# Patient Record
Sex: FEMALE | Race: WHITE | Employment: FULL TIME | URBAN - NONMETROPOLITAN AREA
[De-identification: names, ages, dates, MRNs, and addresses within clinical notes are randomized per-mention and may not be internally consistent; named-entity substitution may affect disease eponyms.]

---

## 2022-02-10 ENCOUNTER — TELEPHONE (OUTPATIENT)
Dept: CARDIOLOGY CLINIC | Age: 62
End: 2022-02-10

## 2022-02-17 ENCOUNTER — OFFICE VISIT (OUTPATIENT)
Dept: CARDIOLOGY CLINIC | Age: 62
End: 2022-02-17

## 2022-02-17 VITALS
SYSTOLIC BLOOD PRESSURE: 122 MMHG | HEIGHT: 66 IN | HEART RATE: 64 BPM | DIASTOLIC BLOOD PRESSURE: 70 MMHG | WEIGHT: 145 LBS | BODY MASS INDEX: 23.3 KG/M2

## 2022-02-17 DIAGNOSIS — I10 PRIMARY HYPERTENSION: Primary | ICD-10-CM

## 2022-02-17 DIAGNOSIS — I48.0 PAROXYSMAL ATRIAL FIBRILLATION (HCC): ICD-10-CM

## 2022-02-17 PROCEDURE — 93000 ELECTROCARDIOGRAM COMPLETE: CPT | Performed by: NUCLEAR MEDICINE

## 2022-02-17 PROCEDURE — 99204 OFFICE O/P NEW MOD 45 MIN: CPT | Performed by: NUCLEAR MEDICINE

## 2022-02-17 RX ORDER — M-VIT,TX,IRON,MINS/CALC/FOLIC 27MG-0.4MG
1 TABLET ORAL DAILY
COMMUNITY

## 2022-02-17 RX ORDER — METOPROLOL SUCCINATE 50 MG/1
50 TABLET, EXTENDED RELEASE ORAL 2 TIMES DAILY
Qty: 180 TABLET | Refills: 3 | Status: SHIPPED | OUTPATIENT
Start: 2022-02-17 | End: 2022-03-23

## 2022-02-17 RX ORDER — METOPROLOL SUCCINATE 50 MG/1
50 TABLET, EXTENDED RELEASE ORAL DAILY
COMMUNITY
End: 2022-02-17 | Stop reason: SDUPTHER

## 2022-02-17 RX ORDER — CALCIUM CARBONATE 500(1250)
500 TABLET ORAL DAILY
COMMUNITY

## 2022-02-17 RX ORDER — DILTIAZEM HYDROCHLORIDE 120 MG/1
120 CAPSULE, EXTENDED RELEASE ORAL DAILY
COMMUNITY
End: 2022-03-23 | Stop reason: ALTCHOICE

## 2022-02-17 RX ORDER — ACETAMINOPHEN 160 MG
TABLET,DISINTEGRATING ORAL
COMMUNITY

## 2022-02-17 RX ORDER — HYDROCHLOROTHIAZIDE 12.5 MG/1
12.5 CAPSULE, GELATIN COATED ORAL DAILY
COMMUNITY

## 2022-02-17 ASSESSMENT — ENCOUNTER SYMPTOMS
ANAL BLEEDING: 0
PHOTOPHOBIA: 0
BACK PAIN: 0
ABDOMINAL DISTENTION: 0
DIARRHEA: 0
CONSTIPATION: 0
NAUSEA: 0
RECTAL PAIN: 0
COLOR CHANGE: 0
VOMITING: 0
ABDOMINAL PAIN: 0
BLOOD IN STOOL: 0
SHORTNESS OF BREATH: 1
CHEST TIGHTNESS: 0

## 2022-02-17 NOTE — PROGRESS NOTES
[address] [facility] [address]  [place] [address]  Dept: [phone]  Dept Fax: [phone]  Loc: [phone]    Visit Date: 2/17/2022    Sae Rodriguez is a 65 y.o. female who presents todayfor:  Chief Complaint   Patient presents with    Check-Up    Atrial Fibrillation    Hypertension   here for the first time  Found to have A fib in November  Started eliquis and metoprolol   Continued to have dyspnea  Dyspnea on exertion   More fatigue  Still smoking   Possible COPD but not tested before  No known CAD no known cath   No chest pain reported  Still having palpitation   No dizziness  No syncope  No bleeding   BP is stable   Smoking  Family history of CAD         HPI:  HPI  No past medical history on file. No past surgical history on file. Family History   Problem Relation Age of Onset    Heart Disease Father      Social History     Tobacco Use    Smoking status: Current Every Day Smoker    Smokeless tobacco: Never Used   Substance Use Topics    Alcohol use: Yes      Current Outpatient Medications   Medication Sig Dispense Refill    hydroCHLOROthiazide (MICROZIDE) 12.5 MG capsule Take 12.5 mg by mouth daily      metoprolol succinate (TOPROL XL) 50 MG extended release tablet Take 50 mg by mouth daily      dilTIAZem (CARDIZEM 12 HR) 120 MG extended release capsule Take 120 mg by mouth daily      apixaban (ELIQUIS) 5 MG TABS tablet Take by mouth 2 times daily      TURMERIC PO Take by mouth      calcium carbonate (OSCAL) 500 MG TABS tablet Take 500 mg by mouth daily      Multiple Vitamins-Minerals (THERAPEUTIC MULTIVITAMIN-MINERALS) tablet Take 1 tablet by mouth daily      Cholecalciferol (VITAMIN D3) 50 MCG (2000 UT) CAPS Take by mouth       No current facility-administered medications for this visit.      Not on File  Health Maintenance   Topic Date Due    Hepatitis C screen  Never done    Depression Screen  Never done   [employer] HIV screen  Never done    DTaP/Tdap/Td vaccine (1 - Tdap) Never done    Cervical cancer screen  Never done    Lipid screen  Never done    Colorectal Cancer Screen  Never done    Breast cancer screen  Never done    Shingles Vaccine (1 of 2) Never done    Flu vaccine (1) Never done    COVID-19 Vaccine (3 - Booster for Pfizer series) 09/06/2021    Hepatitis A vaccine  Aged Out    Hepatitis B vaccine  Aged Out    Hib vaccine  Aged Out    Meningococcal (ACWY) vaccine  Aged Out    Pneumococcal 0-64 years Vaccine  Aged Out       Subjective:  Review of Systems   Constitutional: Positive for fatigue. HENT: Negative for ear discharge and mouth sores. Eyes: Negative for photophobia. Respiratory: Positive for shortness of breath. Negative for chest tightness. Cardiovascular: Positive for palpitations. Negative for chest pain. Gastrointestinal: Negative for abdominal distention, abdominal pain, anal bleeding, blood in stool, constipation, diarrhea, nausea, rectal pain and vomiting. Endocrine: Negative for polyphagia. Genitourinary: Negative for dysuria and hematuria. Musculoskeletal: Negative for arthralgias, back pain, gait problem, joint swelling, myalgias, neck pain and neck stiffness. Skin: Negative for color change, pallor, rash and wound. Allergic/Immunologic: Negative for food allergies. Neurological: Negative for dizziness, syncope and light-headedness. Psychiatric/Behavioral: Negative for confusion, decreased concentration, dysphoric mood and hallucinations. The patient is not nervous/anxious and is not hyperactive. us claudication   \  Objective:  Physical Exam  HENT:      Head: Normocephalic. Right Ear: Tympanic membrane normal.      Nose: Nose normal.   Eyes:      Pupils: Pupils are equal, round, and reactive to light. Cardiovascular:      Rate and Rhythm: Normal rate and regular rhythm. Heart sounds: Murmur heard. No gallop.     Pulmonary:      Effort: No respiratory distress. Breath sounds: No stridor. No wheezing, rhonchi or rales. Chest:      Chest wall: No tenderness. Abdominal:      General: There is no distension. Palpations: There is no mass. Tenderness: There is no abdominal tenderness. There is no left CVA tenderness, guarding or rebound. Hernia: No hernia is present. Musculoskeletal:         General: No swelling, tenderness, deformity or signs of injury. Cervical back: Normal range of motion. Right lower leg: No edema. Left lower leg: No edema. Skin:     Coloration: Skin is not jaundiced or pale. Findings: No bruising, erythema, lesion or rash. Neurological:      Mental Status: She is alert and oriented to person, place, and time. Cranial Nerves: No cranial nerve deficit. Sensory: No sensory deficit. Motor: No weakness. Coordination: Coordination normal.      Gait: Gait normal.      Deep Tendon Reflexes: Reflexes normal.   Psychiatric:         Mood and Affect: Mood normal.       /70   Pulse 64   Ht 5' 6\" (1.676 m)   Wt 145 lb (65.8 kg)   BMI 23.40 kg/m²     Assessment:      Diagnosis Orders   1. Primary hypertension     2. Paroxysmal atrial fibrillation (HCC)     as above  New onset A fib   Risk for CAD  Possible COPD   ECG in office was done today. I reviewed the ECG. A fib     Plan:  No follow-ups on file. As above  Needs ISAÍAS cardioversion   Needs ischemia work up after that   Smoking: discussed with the patient the importance of smoke cessation especially with the risk of CAD. Continue risk factor modification and medical management  Thank you for allowing me to participate in the care of your patient. Please don't hesitate to contact me regarding any further issues related to the patient care    Orders Placed:  No orders of the defined types were placed in this encounter. Medications Prescribed:  No orders of the defined types were placed in this encounter. Discussed use, benefit, and side effects of prescribed medications. All patient questions answered. Pt voicedunderstanding. Instructed to continue current medications, diet and exercise. Continue risk factor modification and medical management. Patient agreed with treatment plan. Follow up as directed.     Electronically signedby Criselda Pacheco MD on 2/17/2022 at 7:59 AM

## 2022-02-21 ENCOUNTER — PREP FOR PROCEDURE (OUTPATIENT)
Dept: CARDIOLOGY | Age: 62
End: 2022-02-21

## 2022-02-21 RX ORDER — SODIUM CHLORIDE 0.9 % (FLUSH) 0.9 %
5-40 SYRINGE (ML) INJECTION EVERY 12 HOURS SCHEDULED
Status: CANCELLED | OUTPATIENT
Start: 2022-02-21

## 2022-02-21 RX ORDER — SODIUM CHLORIDE 9 MG/ML
25 INJECTION, SOLUTION INTRAVENOUS PRN
Status: CANCELLED | OUTPATIENT
Start: 2022-02-21

## 2022-02-21 RX ORDER — SODIUM CHLORIDE 0.9 % (FLUSH) 0.9 %
5-40 SYRINGE (ML) INJECTION PRN
Status: CANCELLED | OUTPATIENT
Start: 2022-02-21

## 2022-02-22 ENCOUNTER — HOSPITAL ENCOUNTER (OUTPATIENT)
Dept: INPATIENT UNIT | Age: 62
Discharge: HOME | End: 2022-02-22
Attending: NUCLEAR MEDICINE | Admitting: NUCLEAR MEDICINE

## 2022-02-22 VITALS
WEIGHT: 142 LBS | HEART RATE: 78 BPM | BODY MASS INDEX: 22.82 KG/M2 | HEIGHT: 66 IN | RESPIRATION RATE: 17 BRPM | TEMPERATURE: 97.3 F | DIASTOLIC BLOOD PRESSURE: 80 MMHG | SYSTOLIC BLOOD PRESSURE: 109 MMHG | OXYGEN SATURATION: 94 %

## 2022-02-22 DIAGNOSIS — I48.0 PAROXYSMAL ATRIAL FIBRILLATION (HCC): ICD-10-CM

## 2022-02-22 DIAGNOSIS — I10 PRIMARY HYPERTENSION: ICD-10-CM

## 2022-02-22 LAB
ANION GAP SERPL CALCULATED.3IONS-SCNC: 13 MEQ/L (ref 8–16)
APTT: 39.4 SECONDS (ref 22–38)
BUN BLDV-MCNC: 16 MG/DL (ref 7–22)
CALCIUM SERPL-MCNC: 10 MG/DL (ref 8.5–10.5)
CHLORIDE BLD-SCNC: 102 MEQ/L (ref 98–111)
CO2: 23 MEQ/L (ref 23–33)
CREAT SERPL-MCNC: 0.7 MG/DL (ref 0.4–1.2)
ERYTHROCYTE [DISTWIDTH] IN BLOOD BY AUTOMATED COUNT: 13.2 % (ref 11.5–14.5)
ERYTHROCYTE [DISTWIDTH] IN BLOOD BY AUTOMATED COUNT: 48.2 FL (ref 35–45)
GFR SERPL CREATININE-BSD FRML MDRD: 85 ML/MIN/1.73M2
GLUCOSE BLD-MCNC: 113 MG/DL (ref 70–108)
HCT VFR BLD CALC: 47.7 % (ref 37–47)
HEMOGLOBIN: 15.5 GM/DL (ref 12–16)
LV EF: 30 %
LVEF MODALITY: NORMAL
MCH RBC QN AUTO: 32.5 PG (ref 26–33)
MCHC RBC AUTO-ENTMCNC: 32.5 GM/DL (ref 32.2–35.5)
MCV RBC AUTO: 100 FL (ref 81–99)
PLATELET # BLD: 187 THOU/MM3 (ref 130–400)
PMV BLD AUTO: 11 FL (ref 9.4–12.4)
POTASSIUM SERPL-SCNC: 4.2 MEQ/L (ref 3.5–5.2)
RBC # BLD: 4.77 MILL/MM3 (ref 4.2–5.4)
SODIUM BLD-SCNC: 138 MEQ/L (ref 135–145)
WBC # BLD: 9.6 THOU/MM3 (ref 4.8–10.8)

## 2022-02-22 PROCEDURE — 93005 ELECTROCARDIOGRAM TRACING: CPT | Performed by: NUCLEAR MEDICINE

## 2022-02-22 PROCEDURE — 93320 DOPPLER ECHO COMPLETE: CPT

## 2022-02-22 PROCEDURE — 92960 CARDIOVERSION ELECTRIC EXT: CPT

## 2022-02-22 PROCEDURE — 6370000000 HC RX 637 (ALT 250 FOR IP)

## 2022-02-22 PROCEDURE — 2500000003 HC RX 250 WO HCPCS

## 2022-02-22 PROCEDURE — 85730 THROMBOPLASTIN TIME PARTIAL: CPT

## 2022-02-22 PROCEDURE — 93312 ECHO TRANSESOPHAGEAL: CPT

## 2022-02-22 PROCEDURE — 92960 CARDIOVERSION ELECTRIC EXT: CPT | Performed by: NUCLEAR MEDICINE

## 2022-02-22 PROCEDURE — 36415 COLL VENOUS BLD VENIPUNCTURE: CPT

## 2022-02-22 PROCEDURE — 80048 BASIC METABOLIC PNL TOTAL CA: CPT

## 2022-02-22 PROCEDURE — 6360000002 HC RX W HCPCS

## 2022-02-22 PROCEDURE — 93325 DOPPLER ECHO COLOR FLOW MAPG: CPT

## 2022-02-22 PROCEDURE — 85027 COMPLETE CBC AUTOMATED: CPT

## 2022-02-22 RX ORDER — SODIUM CHLORIDE 9 MG/ML
25 INJECTION, SOLUTION INTRAVENOUS PRN
Status: DISCONTINUED | OUTPATIENT
Start: 2022-02-22 | End: 2022-02-22 | Stop reason: HOSPADM

## 2022-02-22 RX ORDER — SODIUM CHLORIDE 0.9 % (FLUSH) 0.9 %
5-40 SYRINGE (ML) INJECTION PRN
Status: DISCONTINUED | OUTPATIENT
Start: 2022-02-22 | End: 2022-02-22 | Stop reason: HOSPADM

## 2022-02-22 RX ORDER — LORATADINE 10 MG/1
10 TABLET ORAL PRN
COMMUNITY

## 2022-02-22 RX ORDER — SODIUM CHLORIDE 0.9 % (FLUSH) 0.9 %
5-40 SYRINGE (ML) INJECTION EVERY 12 HOURS SCHEDULED
Status: DISCONTINUED | OUTPATIENT
Start: 2022-02-22 | End: 2022-02-22 | Stop reason: HOSPADM

## 2022-02-22 NOTE — H&P
[address] [address]  Sedation/Analgesia History & Physical    Pt Name: Hollie Horvath  Account number: [de-identified]  MRN: [id number]  YOB: 1960  Provider Performing Procedure: Ivanna Gill MD MD [facility]  Primary Care Physician: GIANCARLO Haynes - AYDIN  Date: 2/22/2022    PRE-PROCEDURE    Code Status: FULL CODE  Brief History/Pre-Procedure Diagnosis:   A fib RVR      Consent: : I have discussed with the patient risks, benefits, and alternatives (and relevant risks, benefits, and side effects related to alternatives or not receiving care), and likelihood of the success. The patient and/or representative appear to understand and agree to proceed. The discussion encompasses risks, benefits, and side effects related to the alternatives and the risks related to not receiving the proposed care, treatment, and services. MEDICAL HISTORY  []ASHD/ANGINA/MI/CHF   [x]Hypertension  []Diabetes  []Hyperlipidemia  []Smoking  []Family Hx of ASHD  []Additional information:       has a past medical history of Hypertension. SURGICAL HISTORY   has a past surgical history that includes Tubal ligation. Additional information:       ALLERGIES   Allergies as of 02/22/2022    (No Known Allergies)     Additional information:       MEDICATIONS   Aspirin  [x] 81 mg  [] 325 mg  [] None  Antiplatelet drug therapy use last 5 days  []No []Yes  Coumadin Use Last 5 Days []No []Yes  Other anticoagulant use last 5 days  []No []Yes    Current Facility-Administered Medications:     0.9 % sodium chloride infusion, 25 mL, IntraVENous, PRN, Allyne Limerick, PA-C    sodium chloride flush 0.9 % injection 5-40 mL, 5-40 mL, IntraVENous, 2 times per day, Allyne Limerick, PA-C    sodium chloride flush 0.9 % injection 5-40 mL, 5-40 mL, IntraVENous, PRN, Allyne Limerick, PA-C  Prior to Admission medications    Medication Sig Start Date End Date Taking?  Authorizing Provider   loratadine (CLARITIN) 10 MG tablet Take 10 mg by mouth as needed   Yes Historical Provider, MD   hydroCHLOROthiazide (MICROZIDE) 12.5 MG capsule Take 12.5 mg by mouth daily   Yes Historical Provider, MD   dilTIAZem (CARDIZEM 12 HR) 120 MG extended release capsule Take 120 mg by mouth daily   Yes Historical Provider, MD   apixaban (ELIQUIS) 5 MG TABS tablet Take by mouth 2 times daily   Yes Historical Provider, MD   TURMERIC PO Take by mouth   Yes Historical Provider, MD   calcium carbonate (OSCAL) 500 MG TABS tablet Take 500 mg by mouth daily   Yes Historical Provider, MD   Multiple Vitamins-Minerals (THERAPEUTIC MULTIVITAMIN-MINERALS) tablet Take 1 tablet by mouth daily   Yes Historical Provider, MD   Cholecalciferol (VITAMIN D3) 50 MCG (2000 UT) CAPS Take by mouth   Yes Historical Provider, MD   metoprolol succinate (TOPROL XL) 50 MG extended release tablet Take 1 tablet by mouth in the morning and at bedtime 2/17/22  Yes Israel Lyman MD     Additional information:       VITAL SIGNS   Vitals:    02/22/22 1110   BP: (!) 119/106   Pulse: 149   Resp: 19   Temp: 97.3 °F (36.3 °C)   SpO2: 99%       PHYSICAL:   General: No acute distress  HEENT:  Unremarkable for age  Neck: without increased JVD, carotid pulses 2+ bilaterally without bruits  Heart: RRR, S1 & S2 WNL, S4 gallop, without murmurs or rubs    Lungs: Clear to auscultation    Abdomen: BS present, without HSM, masses, or tenderness    Extremities: without C,C,E.  Pulses 2+ bilaterally  Mental Status: Alert & Oriented        PLANNED PROCEDURE   []Cath  []PCI                []Pacemaker/AICD  [x]ISAÍAS             [x]Cardioversion []Peripheral angiography/PTA  []Other:      SEDATION  Planned agent:[x]Midazolam []Meperidine [x]Sublimaze []Morphine  []Diazepam  []Other:     ASA Classification:  []1 [x]2 []3 []4 []5  Class 1: A normal healthy patient  Class 2: Pt with mild to moderate systemic disease  Class 3: Severe systemic disease or disturbance  Class 4: Severe systemic disorders that are already life threatening. Class 5: Moribund pt with little chances of survival, for more than 24 hours. Mallampati I Airway Classification:   []1 [x]2 []3 []4    [x]Pre-procedure diagnostic studies complete and results available. Comment:    [x]Previous sedation/anesthesia experiences assessed. Comment:    [x]The patient is an appropriate candidate to undergo the planned procedure sedation and anesthesia. (Refer to nursing sedation/analgesia documentation record)  [x]Formulation and discussion of sedation/procedure plan, risks, and expectations with patient and/or responsible adult completed. [x]Patient examined immediately prior to the procedure.  (Refer to nursing sedation/analgesia documentation record)    Criselda Pacheco MD MD [facility]  Electronically signed 2/22/2022 at 12:27 PM

## 2022-02-22 NOTE — FLOWSHEET NOTE
Received from cath lab. Alert and cooperative. Family at bedside. 0.9 normal saline cont.  Resting with easy resp

## 2022-02-22 NOTE — PROGRESS NOTES
Patient admitted to 07  Ambulatory for ISAÍAS/CV. Patient NPO. Patient accompanied by spouse. Vital signs obtained. Assessment and data collection intiated. Oriented to room. Policies and procedures for  explained. All questions answered with no further questions at this time. Fall prevention and safety precautions discussed with patient.

## 2022-02-22 NOTE — FLOWSHEET NOTE
Discharged home in sinus rhythm.   Patient and spouse verbalize understanding of discharge instructions and follow-up

## 2022-02-22 NOTE — PROCEDURES
[address] [place] [address]                            CARDIAC CATHETERIZATION    PATIENT NAME: Javier Aponte                 :        1960  MED REC NO:   [id number]                           ROOM:       0007  ACCOUNT NO:   [de-identified]                           ADMIT DATE: 2022  PROVIDER:     Grace Knight M.D.    Dennis Cottoners OF PROCEDURE:  2022    CARDIOVERSION    CLINICAL HISTORY AND INDICATION:  A 58-year-old patient with atrial  fibrillation, new-onset with rapid ventricular response, referred for  ISAÍAS-guided cardioversion. PROCEDURES:  1. ISAÍAS-guided cardioversion. 2.  Sedation:  Total of 5 of Versed, 100 of fentanyl between 01:00 and  01:30 p.m. in my presence under my supervision. 3.  Reversal of sedation with 0.2 mg of Romazicon, 0.4 of Narcan to  evaluate the patient neurological condition. PROCEDURE IN DETAILS:  The patient was brought to cath lab. Hands-free  pads were applied to right upper chest, left upper back. The patient  received a shock from a biphasic defibrillator at 250 joules after  obtaining a ISAÍAS to rule out intracardiac thrombi. The patient converted  to sinus rhythm without complication. Sedation was reversed. She was  awake, alert, oriented x3. CONCLUSION:  1. Successful cardioversion. 2.  No complication. RECOMMENDATIONS:  At this point, we need to keep a close eye on the  patient's LV function as her LV function was quite reduced which is most  likely due to the significant tachycardia that _____. We will consider  further ischemic _____.         Gregorio Lanes, M.D.    D: 2022 16:27:31       T: 2022 16:29:45     [email or web address]  Job#: [id number]     Doc#: [id number]    CC:

## 2022-02-23 LAB
EKG ATRIAL RATE: 67 BPM
EKG P AXIS: 60 DEGREES
EKG P-R INTERVAL: 184 MS
EKG Q-T INTERVAL: 306 MS
EKG Q-T INTERVAL: 426 MS
EKG QRS DURATION: 86 MS
EKG QRS DURATION: 90 MS
EKG QTC CALCULATION (BAZETT): 450 MS
EKG QTC CALCULATION (BAZETT): 483 MS
EKG R AXIS: 11 DEGREES
EKG R AXIS: 2 DEGREES
EKG T AXIS: 38 DEGREES
EKG T AXIS: 52 DEGREES
EKG VENTRICULAR RATE: 150 BPM
EKG VENTRICULAR RATE: 67 BPM

## 2022-02-23 PROCEDURE — 93010 ELECTROCARDIOGRAM REPORT: CPT | Performed by: INTERNAL MEDICINE

## 2022-03-14 PROBLEM — I42.9 CARDIOMYOPATHY (HCC): Status: ACTIVE | Noted: 2022-03-14

## 2022-03-14 NOTE — PROGRESS NOTES
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   Tobacco Use    Smoking status: Current Every Day Smoker     Packs/day: 0.25    Smokeless tobacco: Never Used   Vaping Use    Vaping Use: Never used   Substance and Sexual Activity    Alcohol use: Yes     Alcohol/week: 12.0 standard drinks     Types: 12 Glasses of wine per week    Drug use: Never    Sexual activity: None   Other Topics Concern    None   Social History Narrative    None     Social Determinants of Health     Financial Resource Strain:     Difficulty of Paying Living Expenses: Not on file   Food Insecurity:     Worried About Running Out of Food in the Last Year: Not on file    Gail of Food in the Last Year: Not on file   Transportation Needs:     Lack of Transportation (Medical): Not on file    Lack of Transportation (Non-Medical):  Not on file   Physical Activity:     Days of Exercise per Week: Not on file    Minutes of Exercise per Session: Not on file   Stress:     Feeling of Stress : Not on file   Social Connections:     Frequency of Communication with Friends and Family: Not on file    Frequency of Social Gatherings with Friends and Family: Not on file    Attends Jainism Services: Not on file    Active Member of [address] [place] [address] [employer] or Organizations: Not on file    Attends Club or Organization Meetings: Not on file    Marital Status: Not on file   Intimate Partner Violence:     Fear of Current or Ex-Partner: Not on file    Emotionally Abused: Not on file    Physically Abused: Not on file    Sexually Abused: Not on file   Housing Stability:     Unable to Pay for Housing in the Last Year: Not on file    Number of [place] in the Last Year: Not on file    Unstable Housing in the Last Year: Not on file       Family History   Problem Relation Age of Onset    Heart Disease Father        Blood pressure 108/70, pulse 144, height 5' 6\" (1.676 m), weight 147 lb (66.7 kg). General:   Well developed, well nourished  Lungs:   Clear to auscultation, no rales  Heart:    Irregular, fast, Normal S1 S2, No murmur, rubs, or gallops  Abdomen:   Soft, non tender, no organomegalies, positive bowel sounds  Extremities:   2+ bilat LE edema, no cyanosis, good peripheral pulses  Neurological:   Awake, alert, oriented. No obvious focal deficits  Musculoskeletal:  No obvious deformities    EKG: reviewed, afib rvr, HR 140s          Diagnosis Orders   1. Paroxysmal atrial fibrillation (HCC)  EKG 12 Lead    Cardioversion external   2. Primary hypertension  EKG 12 Lead   3. Other cardiomyopathy ([address]  EKG 12 Lead       Orders Placed This Encounter   Procedures    Cardioversion external     Standing Status:   Future     Standing Expiration Date:   3/16/2023    EKG 12 Lead     Order Specific Question:   Reason for Exam?     Answer:   Irregular heart rate       Assessment/Plan:   PAF- afib rvr 140s HR. Per Papo Pate, Start digoxin 125 mcg daily. Take 250 mcg first 2 days. Refer to  [facility] soon. CDMP--likely 2/2 afib rvr--ischemic evaluation needed after afib controlled. Leg edema is significant, likely from tachy induced. HTN- well controlled, stable at 110s/70s     Referral to [facility] for PAF. Disposition:   F/u with Dr Papo Pate as scheduled.    Continue Dr Modesta Rosenthal current treatment plan  Follow up with Dr Papo Pate as scheduled or sooner if needed

## 2022-03-16 ENCOUNTER — OFFICE VISIT (OUTPATIENT)
Dept: CARDIOLOGY CLINIC | Age: 62
End: 2022-03-16

## 2022-03-16 VITALS
BODY MASS INDEX: 23.63 KG/M2 | SYSTOLIC BLOOD PRESSURE: 108 MMHG | HEIGHT: 66 IN | WEIGHT: 147 LBS | DIASTOLIC BLOOD PRESSURE: 70 MMHG | HEART RATE: 144 BPM

## 2022-03-16 DIAGNOSIS — I42.8 OTHER CARDIOMYOPATHY (HCC): ICD-10-CM

## 2022-03-16 DIAGNOSIS — I48.0 PAROXYSMAL ATRIAL FIBRILLATION (HCC): Primary | ICD-10-CM

## 2022-03-16 DIAGNOSIS — I10 PRIMARY HYPERTENSION: ICD-10-CM

## 2022-03-16 PROCEDURE — 99214 OFFICE O/P EST MOD 30 MIN: CPT | Performed by: STUDENT IN AN ORGANIZED HEALTH CARE EDUCATION/TRAINING PROGRAM

## 2022-03-16 PROCEDURE — 93000 ELECTROCARDIOGRAM COMPLETE: CPT | Performed by: STUDENT IN AN ORGANIZED HEALTH CARE EDUCATION/TRAINING PROGRAM

## 2022-03-16 RX ORDER — DIGOXIN 125 MCG
125 TABLET ORAL DAILY
Qty: 30 TABLET | Refills: 3 | Status: ON HOLD
Start: 2022-03-16 | End: 2022-04-21 | Stop reason: HOSPADM

## 2022-03-16 NOTE — PROGRESS NOTES
ISAÍAS/CV follow up. EKG done today. C/o sob \"all the time\" and AUDREY. Denies cp, palpitations, dizziness.

## 2022-03-22 NOTE — PROGRESS NOTES
[address] [facility]  Dept: [phone]  CARDIAC ELECTROPHYSIOLOGY: CONSULTATION NOTE  PATIENT DEMOGRAPHICS:  Date:   3/23/2022  Patient name:              Festus Watson  YOB: 1960  Sex: female   MRN:   [id number]    PRIMARY CARE PHYSICIAN:   GIANCARLO Osorio - CNP    REFERRING PHYSICIAN:  Daphne Charlton MD    REASON FOR CONSULTATION:  Atrial fibrillation management. HISTORY OF PRESENT ILLNESS:  62/F started experience intermittent palpitation, shortness of breath and lower extremity swelling since summer 2021. She was diagnosed to have atrial fibrillation by her primary care physician in 11/20/2021. Evaluated by Dr. Polo Pascual. She underwent ISAÍAS guided electrocardioversion on 2/22/2022 and was started on a combination of metoprolol, Cardizem and digoxin. Her ISAÍAS showed severe left ankle systolic dysfunction, EF 89%. She felt better for several weeks and that has recurrent symptoms. During a follow-up visit she was noted to have recurrent atrial fibrillation and was referred here for further management. Continues to have intermittent palpitations and exertional shortness of breath. No orthopnea, proximal nocturnal dyspnea or syncope. Her lower extremity swelling has improved. Medical Hx: PeAF dx 11/2021, NICM dx 11/2021 (LVEF 30%), HTN and chronic smoking. REVIEW OF SYSTEMS:    Constitutional: Negative for chills and fever  HENT: Negative for congestion, sinus pressure, sneezing and sore throat. Eyes: Negative for pain, discharge, redness and itching. Respiratory: Negative for apnea, cough  Gastrointestinal: Negative for blood in stool, constipation, diarrhea   Endocrine: Negative for cold intolerance, heat intolerance, polydipsia. Genitourinary: Negative for dysuria, enuresis, flank pain and hematuria. Musculoskeletal: Negative for arthralgias, joint swelling and neck pain.    Neurological: Negative for numbness and headaches. Psychiatric/Behavioral: Negative for agitation, confusion, decreased concentration and dysphoric mood. PAST MEDICAL HISTORY:  Past Medical History:   Diagnosis Date    Hypertension        PSH:  Past Surgical History:   Procedure Laterality Date    TUBAL LIGATION         FAMILY HISTORY:  Family History   Problem Relation Age of Onset    Heart Disease Father         SOCIAL HISTORY:   and lives with her . 3 children and 2 grandchildren. Smokes a pack of cigarettes for many years. 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   Tobacco Use    Smoking status: Current Every Day Smoker     Packs/day: 0.25    Smokeless tobacco: Never Used   Vaping Use    Vaping Use: Never used   Substance and Sexual Activity    Alcohol use: Yes     Alcohol/week: 12.0 standard drinks     Types: 12 Glasses of wine per week    Drug use: Never    Sexual activity: Not on file   Other Topics Concern    Not on file   Social History Narrative    Not on file     Social Determinants of Health     Financial Resource Strain:     Difficulty of Paying Living Expenses: Not on file   Food Insecurity:     Worried About Running Out of Food in the Last Year: Not on file    Gail of Food in the Last Year: Not on file   Transportation Needs:     Lack of Transportation (Medical): Not on file    Lack of Transportation (Non-Medical):  Not on file   Physical Activity:     Days of Exercise per Week: Not on file    Minutes of Exercise per Session: Not on file   Stress:     Feeling of Stress : Not on file   Social Connections:     Frequency of Communication with Friends and Family: Not on file    Frequency of Social Gatherings with Friends and Family: Not on file    Attends [employer]ist Services: Not on file    Active Member of Clubs or Organizations: Not on file    Attends Club or Organization Meetings: Not on file    Marital Status: Not on file   Intimate Partner Violence:     Fear of Current or Ex-Partner: Not on file    Emotionally Abused: Not on file    Physically Abused: Not on file    Sexually Abused: Not on file   Housing Stability:     Unable to Pay for Housing in the Last Year: Not on file    Number of [place] in the Last Year: Not on file    Unstable Housing in the Last Year: Not on file        ALLERGY HISTORY:  No Known Allergies     MEDICATIONS:  Current Outpatient Medications   Medication Sig Dispense Refill    digoxin (LANOXIN) 125 MCG tablet Take 1 tablet by mouth daily 30 tablet 3    loratadine (CLARITIN) 10 MG tablet Take 10 mg by mouth as needed      hydroCHLOROthiazide (MICROZIDE) 12.5 MG capsule Take 12.5 mg by mouth daily      dilTIAZem (CARDIZEM 12 HR) 120 MG extended release capsule Take 120 mg by mouth daily      apixaban (ELIQUIS) 5 MG TABS tablet Take by mouth 2 times daily      TURMERIC PO Take by mouth      calcium carbonate (OSCAL) 500 MG TABS tablet Take 500 mg by mouth daily      Multiple Vitamins-Minerals (THERAPEUTIC MULTIVITAMIN-MINERALS) tablet Take 1 tablet by mouth daily      Cholecalciferol (VITAMIN D3) 50 MCG (2000 UT) CAPS Take by mouth      metoprolol succinate (TOPROL XL) 50 MG extended release tablet Take 1 tablet by mouth in the morning and at bedtime 180 tablet 3     No current facility-administered medications for this visit. PHYSICAL EXAM:  Vitals:    03/23/22 0942   BP: (!) 123/91   Pulse: 114     Body mass index is 23.02 kg/m². GENERAL: Alert and oriented. No distress. EYES: No pallor or icterus. ENT: No cyanosis. No thyromegaly or cervical LAP. VESSELS: No jugular venous distension or carotid bruits. HEART: Normal S1/S2.  No murmur, rub or gallop. LUNGS: Clear to auscultation. ABDOMEN: Soft and non-tender. EXTREMITIES: No lower extremity edema. Feet are warm. NEUROLOGICAL: Grossly normal.     LABORATORY DATA AND DIAGNOSTIC DATA:  I have personally reviewed and interpreted the results of the following diagnostic testing    Lab Results   Component Value Date    WBC 9.6 02/22/2022    HGB 15.5 02/22/2022    HCT 47.7 (H) 02/22/2022     02/22/2022     02/22/2022    K 4.2 02/22/2022     02/22/2022    CREATININE 0.7 02/22/2022    BUN 16 02/22/2022    CO2 23 02/22/2022      Echocardiogram 2/22/2022 (ISAÍAS): LVEF 30% and moderate MR.   ECG 2/17/2022, 2/22/2022, 3/16/2022 and 3/23/2022: AF RVR. Normal QRS. IMPRESSION:  · PeAF s/p DCCV with recurrences on metoprolol, diltiazem and digoxin. [id number] score= 3 (gender, HTN and CHF) on Apixaban. · NICM, LVEF 30% and NYHA III.  · HTN, controlled. · Chronic smoking, patient counseled. ASSESSMENT AND RECOMMENDATIONS:  Discussed management of heart failure and atrial fibrillation with the patient. Unfortunately, she had recurrences following her cardioversion and is quite symptomatic. Discontinue diltiazem, increase metoprolol succinate 200 mg daily, continue digoxin and anticoagulation. Start Entresto 24/26 mg twice daily. We will request cardiac catheterization to exclude coronary artery disease and a limited echo and then proceed with rhythm control strategy, likely with catheter ablation with short-term antiarrhythmic drug use. Low-salt diet, lifestyle modification discussed. The patient and her  were understanding of the discussion. Thank you for allowing me to participate in the care of your patient. Please call me if you have any questions. **This report has been created using voice recognition software. It may contain minor errors which are inherent in voice recognition technology. **     Harsha Weiss MD, MRCP, [facility], [facility] on 3/23/2022 at 10:16 AM

## 2022-03-23 ENCOUNTER — CARE COORDINATION (OUTPATIENT)
Dept: CARE COORDINATION | Age: 62
End: 2022-03-23

## 2022-03-23 ENCOUNTER — OFFICE VISIT (OUTPATIENT)
Dept: CARDIOLOGY CLINIC | Age: 62
End: 2022-03-23

## 2022-03-23 ENCOUNTER — TELEPHONE (OUTPATIENT)
Dept: CARDIOLOGY CLINIC | Age: 62
End: 2022-03-23

## 2022-03-23 VITALS
HEART RATE: 114 BPM | WEIGHT: 144.8 LBS | SYSTOLIC BLOOD PRESSURE: 123 MMHG | HEIGHT: 67 IN | DIASTOLIC BLOOD PRESSURE: 91 MMHG | BODY MASS INDEX: 22.73 KG/M2

## 2022-03-23 DIAGNOSIS — R00.2 INTERMITTENT PALPITATIONS: ICD-10-CM

## 2022-03-23 DIAGNOSIS — I42.8 OTHER CARDIOMYOPATHY (HCC): ICD-10-CM

## 2022-03-23 DIAGNOSIS — I48.0 PAROXYSMAL ATRIAL FIBRILLATION (HCC): Primary | ICD-10-CM

## 2022-03-23 PROCEDURE — 99204 OFFICE O/P NEW MOD 45 MIN: CPT | Performed by: INTERNAL MEDICINE

## 2022-03-23 PROCEDURE — 93000 ELECTROCARDIOGRAM COMPLETE: CPT | Performed by: INTERNAL MEDICINE

## 2022-03-23 RX ORDER — METOPROLOL SUCCINATE 50 MG/1
100 TABLET, EXTENDED RELEASE ORAL 2 TIMES DAILY
Qty: 180 TABLET | Refills: 3 | Status: SHIPPED | OUTPATIENT
Start: 2022-03-23

## 2022-03-23 RX ORDER — LOSARTAN POTASSIUM 25 MG/1
25 TABLET ORAL DAILY
COMMUNITY
End: 2022-03-23 | Stop reason: SDUPTHER

## 2022-03-23 NOTE — PROGRESS NOTES
NP Afib ISAÍAS/ Cardioversion 2-22-22.  Pt states she went back out of rhythm 2 days after    C/o occasional palpitations and swelling in ankles

## 2022-03-24 RX ORDER — LOSARTAN POTASSIUM 25 MG/1
25 TABLET ORAL DAILY
Qty: 30 TABLET | Refills: 1 | Status: SHIPPED | OUTPATIENT
Start: 2022-03-24 | End: 2022-04-13

## 2022-03-24 NOTE — CARE COORDINATION
I left a message for [facility] to give me a call back to discuss medication assistance. I went ahead and mailed her the information and faxed the MD's office while I wait for her call back.         [address] [facility]   Medication Assistance  [address] [place] [address], and [employer]    L) [phone] (Y) [phone]

## 2022-03-24 NOTE — CARE COORDINATION
Cedrick Santana called me back and I let her know what I am able to do for her, she will call me with any questions.

## 2022-03-31 ENCOUNTER — CARE COORDINATION (OUTPATIENT)
Dept: CARE COORDINATION | Age: 62
End: 2022-03-31

## 2022-04-04 ENCOUNTER — CARE COORDINATION (OUTPATIENT)
Dept: CARE COORDINATION | Age: 62
End: 2022-04-04

## 2022-04-04 NOTE — PRE-PROCEDURE INSTRUCTIONS
[place] on 2nd floor [facility]  NPO after midnight  Bring drivers license and insurance information  Wear comfortable clean clothes  Shower morning of and night before with liquid antibacterial soap  Remove jewelry   May have to stay overnight if have PTCA/stent - bring small overnight bag  Bring medications in original bottles - may take am meds with small amount of water. Do not take any diabetic meds day of procedure. Made aware of visitors limit to 2 at a time  Follow all instructions given by your physician  Please notify doctor office if you need to cancel or reschedule your procedure   needed at discharge  Bring and wear your mask.   If you use CPap or BiPap for sleep apnea, please bring machine with you  Leave valuables at home

## 2022-04-04 NOTE — CARE COORDINATION
Patient was approved into the [employer] patient assistance for her [place] for a whole year. I left her a message to call me back and we can discuss it more.         [facility]   Medication Assistance  [facility], and [employer]    J) [phone] (O) [phone]

## 2022-04-05 NOTE — CARE COORDINATION
Second attempt in reaching the patient to let her know she was approved into the [employer], no answer. The company will send her enrollment paper work soon.

## 2022-04-07 ENCOUNTER — PREP FOR PROCEDURE (OUTPATIENT)
Dept: CARDIOLOGY | Age: 62
End: 2022-04-07

## 2022-04-07 RX ORDER — SODIUM CHLORIDE 9 MG/ML
INJECTION, SOLUTION INTRAVENOUS CONTINUOUS
Status: CANCELLED | OUTPATIENT
Start: 2022-04-07

## 2022-04-07 RX ORDER — ASPIRIN 325 MG
325 TABLET ORAL ONCE
Status: CANCELLED | OUTPATIENT
Start: 2022-04-07 | End: 2022-04-07

## 2022-04-07 RX ORDER — SODIUM CHLORIDE 0.9 % (FLUSH) 0.9 %
5-40 SYRINGE (ML) INJECTION EVERY 12 HOURS SCHEDULED
Status: CANCELLED | OUTPATIENT
Start: 2022-04-07

## 2022-04-07 RX ORDER — NITROGLYCERIN 0.4 MG/1
0.4 TABLET SUBLINGUAL EVERY 5 MIN PRN
Status: CANCELLED | OUTPATIENT
Start: 2022-04-07

## 2022-04-07 RX ORDER — SODIUM CHLORIDE 9 MG/ML
25 INJECTION, SOLUTION INTRAVENOUS PRN
Status: CANCELLED | OUTPATIENT
Start: 2022-04-07

## 2022-04-07 RX ORDER — SODIUM CHLORIDE 0.9 % (FLUSH) 0.9 %
5-40 SYRINGE (ML) INJECTION PRN
Status: CANCELLED | OUTPATIENT
Start: 2022-04-07

## 2022-04-08 ENCOUNTER — APPOINTMENT (OUTPATIENT)
Dept: CARDIAC CATH/INVASIVE PROCEDURES | Age: 62
End: 2022-04-08

## 2022-04-08 ENCOUNTER — HOSPITAL ENCOUNTER (OUTPATIENT)
Dept: NON INVASIVE DIAGNOSTICS | Age: 62
Discharge: HOME | End: 2022-04-08

## 2022-04-08 ENCOUNTER — HOSPITAL ENCOUNTER (OUTPATIENT)
Dept: INPATIENT UNIT | Age: 62
Discharge: HOME | End: 2022-04-08
Attending: NUCLEAR MEDICINE | Admitting: NUCLEAR MEDICINE

## 2022-04-08 VITALS
HEIGHT: 67 IN | DIASTOLIC BLOOD PRESSURE: 77 MMHG | RESPIRATION RATE: 14 BRPM | HEART RATE: 105 BPM | OXYGEN SATURATION: 94 % | SYSTOLIC BLOOD PRESSURE: 107 MMHG | BODY MASS INDEX: 22.44 KG/M2 | TEMPERATURE: 97.8 F | WEIGHT: 143 LBS

## 2022-04-08 DIAGNOSIS — R00.2 INTERMITTENT PALPITATIONS: ICD-10-CM

## 2022-04-08 DIAGNOSIS — I48.0 PAROXYSMAL ATRIAL FIBRILLATION (HCC): ICD-10-CM

## 2022-04-08 DIAGNOSIS — I42.8 OTHER CARDIOMYOPATHY (HCC): ICD-10-CM

## 2022-04-08 LAB
ABO: NORMAL
ANION GAP SERPL CALCULATED.3IONS-SCNC: 13 MEQ/L (ref 8–16)
ANTIBODY SCREEN: NORMAL
APTT: 32 SECONDS (ref 22–38)
BUN BLDV-MCNC: 15 MG/DL (ref 7–22)
CALCIUM SERPL-MCNC: 10.3 MG/DL (ref 8.5–10.5)
CHLORIDE BLD-SCNC: 102 MEQ/L (ref 98–111)
CHOLESTEROL, TOTAL: 184 MG/DL (ref 100–199)
CO2: 25 MEQ/L (ref 23–33)
CREAT SERPL-MCNC: 0.6 MG/DL (ref 0.4–1.2)
EKG Q-T INTERVAL: 286 MS
EKG QRS DURATION: 90 MS
EKG QTC CALCULATION (BAZETT): 390 MS
EKG R AXIS: -27 DEGREES
EKG T AXIS: -133 DEGREES
EKG VENTRICULAR RATE: 112 BPM
ERYTHROCYTE [DISTWIDTH] IN BLOOD BY AUTOMATED COUNT: 14.3 % (ref 11.5–14.5)
ERYTHROCYTE [DISTWIDTH] IN BLOOD BY AUTOMATED COUNT: 51.8 FL (ref 35–45)
GFR SERPL CREATININE-BSD FRML MDRD: > 90 ML/MIN/1.73M2
GLUCOSE BLD-MCNC: 111 MG/DL (ref 70–108)
HCT VFR BLD CALC: 50.7 % (ref 37–47)
HDLC SERPL-MCNC: 56 MG/DL
HEMOGLOBIN: 16.5 GM/DL (ref 12–16)
INR BLD: 1.09 (ref 0.85–1.13)
LDL CHOLESTEROL CALCULATED: 103 MG/DL
MCH RBC QN AUTO: 31.9 PG (ref 26–33)
MCHC RBC AUTO-ENTMCNC: 32.5 GM/DL (ref 32.2–35.5)
MCV RBC AUTO: 97.9 FL (ref 81–99)
PLATELET # BLD: 191 THOU/MM3 (ref 130–400)
PMV BLD AUTO: 11 FL (ref 9.4–12.4)
POTASSIUM SERPL-SCNC: 4.1 MEQ/L (ref 3.5–5.2)
RBC # BLD: 5.18 MILL/MM3 (ref 4.2–5.4)
RH FACTOR: NORMAL
SODIUM BLD-SCNC: 140 MEQ/L (ref 135–145)
TRIGL SERPL-MCNC: 123 MG/DL (ref 0–199)
WBC # BLD: 8.3 THOU/MM3 (ref 4.8–10.8)

## 2022-04-08 PROCEDURE — 86900 BLOOD TYPING SEROLOGIC ABO: CPT

## 2022-04-08 PROCEDURE — 93307 TTE W/O DOPPLER COMPLETE: CPT

## 2022-04-08 PROCEDURE — 86901 BLOOD TYPING SEROLOGIC RH(D): CPT

## 2022-04-08 PROCEDURE — 6360000002 HC RX W HCPCS

## 2022-04-08 PROCEDURE — 2500000003 HC RX 250 WO HCPCS

## 2022-04-08 PROCEDURE — 85730 THROMBOPLASTIN TIME PARTIAL: CPT

## 2022-04-08 PROCEDURE — 99152 MOD SED SAME PHYS/QHP 5/>YRS: CPT | Performed by: NUCLEAR MEDICINE

## 2022-04-08 PROCEDURE — 85610 PROTHROMBIN TIME: CPT

## 2022-04-08 PROCEDURE — C1769 GUIDE WIRE: HCPCS

## 2022-04-08 PROCEDURE — 80048 BASIC METABOLIC PNL TOTAL CA: CPT

## 2022-04-08 PROCEDURE — 85027 COMPLETE CBC AUTOMATED: CPT

## 2022-04-08 PROCEDURE — 6360000004 HC RX CONTRAST MEDICATION: Performed by: NUCLEAR MEDICINE

## 2022-04-08 PROCEDURE — 36415 COLL VENOUS BLD VENIPUNCTURE: CPT

## 2022-04-08 PROCEDURE — 93458 L HRT ARTERY/VENTRICLE ANGIO: CPT | Performed by: NUCLEAR MEDICINE

## 2022-04-08 PROCEDURE — C1894 INTRO/SHEATH, NON-LASER: HCPCS

## 2022-04-08 PROCEDURE — 80061 LIPID PANEL: CPT

## 2022-04-08 PROCEDURE — 6370000000 HC RX 637 (ALT 250 FOR IP): Performed by: PHYSICIAN ASSISTANT

## 2022-04-08 PROCEDURE — 2580000003 HC RX 258: Performed by: PHYSICIAN ASSISTANT

## 2022-04-08 PROCEDURE — 86850 RBC ANTIBODY SCREEN: CPT

## 2022-04-08 PROCEDURE — 93010 ELECTROCARDIOGRAM REPORT: CPT | Performed by: INTERNAL MEDICINE

## 2022-04-08 PROCEDURE — 93005 ELECTROCARDIOGRAM TRACING: CPT | Performed by: PHYSICIAN ASSISTANT

## 2022-04-08 PROCEDURE — 93458 L HRT ARTERY/VENTRICLE ANGIO: CPT

## 2022-04-08 RX ORDER — ACETAMINOPHEN 325 MG/1
650 TABLET ORAL EVERY 4 HOURS PRN
Status: DISCONTINUED | OUTPATIENT
Start: 2022-04-08 | End: 2022-04-08 | Stop reason: HOSPADM

## 2022-04-08 RX ORDER — SODIUM CHLORIDE 0.9 % (FLUSH) 0.9 %
5-40 SYRINGE (ML) INJECTION EVERY 12 HOURS SCHEDULED
Status: DISCONTINUED | OUTPATIENT
Start: 2022-04-08 | End: 2022-04-08 | Stop reason: HOSPADM

## 2022-04-08 RX ORDER — SODIUM CHLORIDE 9 MG/ML
INJECTION, SOLUTION INTRAVENOUS CONTINUOUS
Status: DISCONTINUED | OUTPATIENT
Start: 2022-04-08 | End: 2022-04-08 | Stop reason: HOSPADM

## 2022-04-08 RX ORDER — SODIUM CHLORIDE 9 MG/ML
INJECTION, SOLUTION INTRAVENOUS CONTINUOUS
Status: DISCONTINUED | OUTPATIENT
Start: 2022-04-08 | End: 2022-04-08 | Stop reason: SDUPTHER

## 2022-04-08 RX ORDER — ASPIRIN 325 MG
325 TABLET ORAL ONCE
Status: COMPLETED | OUTPATIENT
Start: 2022-04-08 | End: 2022-04-08

## 2022-04-08 RX ORDER — NITROGLYCERIN 0.4 MG/1
0.4 TABLET SUBLINGUAL EVERY 5 MIN PRN
Status: DISCONTINUED | OUTPATIENT
Start: 2022-04-08 | End: 2022-04-08 | Stop reason: HOSPADM

## 2022-04-08 RX ORDER — ATROPINE SULFATE 0.4 MG/ML
0.5 AMPUL (ML) INJECTION
Status: DISCONTINUED | OUTPATIENT
Start: 2022-04-08 | End: 2022-04-08 | Stop reason: HOSPADM

## 2022-04-08 RX ADMIN — ASPIRIN 325 MG: 325 TABLET ORAL at 11:00

## 2022-04-08 RX ADMIN — SODIUM CHLORIDE: 9 INJECTION, SOLUTION INTRAVENOUS at 10:52

## 2022-04-08 RX ADMIN — IOPAMIDOL 75 ML: 755 INJECTION, SOLUTION INTRAVENOUS at 12:16

## 2022-04-08 NOTE — OP NOTE
[address] [address]  Sedation/Analgesia Post Sedation Record    Pt Name: Sinda Hammans  Account number: [de-identified]  MRN: [id number]  YOB: 1960  Procedure Performed By: Boni Woodward MD MD [facility]  Primary Care Physician: Leslee Brandon APRN - CNP  Date: 4/8/2022    POST-PROCEDURE    Physicians/Assistants: Boni Woodward MD MD [facility]  Procedure Performed: cath    Sedation/Anesthesia: Versed/ Fentanyl and 2% xylocaine local anesthesia. Estimated Blood Loss: < 10 ml.    Specimens Removed: None       Disposition of Specimen: N/A      Complications: None Immediate      Post-procedure Diagnosis/Findings: no CAd              Recommendations: medical RX               Boni Woodward MD MD [facility]  Electronically signed 4/8/2022 at 1:43 PM

## 2022-04-08 NOTE — H&P
[facility]  Sedation/Analgesia History & Physical    Pt Name: Kendrick Estrella  Account number: [de-identified]  MRN: [id number]  YOB: 1960  Provider Performing Procedure: Lisa Mejias MD MD [facility]  Primary Care Physician: Tyrone Gibbons, APRN - CNP  Date: 4/8/2022    PRE-PROCEDURE    Code Status: FULL CODE  Brief History/Pre-Procedure Diagnosis:   CMP     Consent: : I have discussed with the patient risks, benefits, and alternatives (and relevant risks, benefits, and side effects related to alternatives or not receiving care), and likelihood of the success. The patient and/or representative appear to understand and agree to proceed. The discussion encompasses risks, benefits, and side effects related to the alternatives and the risks related to not receiving the proposed care, treatment, and services. MEDICAL HISTORY  []ASHD/ANGINA/MI/CHF   [x]Hypertension  []Diabetes  []Hyperlipidemia  []Smoking  []Family Hx of ASHD  []Additional information:       has a past medical history of Atrial fibrillation ([facility] [address] and Hypertension. SURGICAL HISTORY   has a past surgical history that includes Tubal ligation.   Additional information:       ALLERGIES   Allergies as of 04/08/2022    (No Known Allergies)     Additional information:       MEDICATIONS   Aspirin  [x] 81 mg  [] 325 mg  [] None  Antiplatelet drug therapy use last 5 days  []No []Yes  Coumadin Use Last 5 Days []No []Yes  Other anticoagulant use last 5 days  []No []Yes    Current Facility-Administered Medications:     0.9 % sodium chloride infusion, , IntraVENous, Continuous, Justina Araujo PA-C    aspirin tablet 325 mg, 325 mg, Oral, Once, Justina Araujo PA-C    nitroGLYCERIN (NITROSTAT) SL tablet 0.4 mg, 0.4 mg, SubLINGual, Q5 Min PRN, Justina Araujo PA-C    sodium chloride flush 0.9 % injection 5-40 mL, 5-40 mL, IntraVENous, 2 times per day, Justina Araujo PA-C  Prior to Admission medications Medication Sig Start Date End Date Taking?  Authorizing Provider   losartan (COZAAR) 25 MG tablet Take 1 tablet by mouth daily 3/22/3/22 PT IS TO TAKE LOSARTAN 25 MG DAILY AND WILL STOP TAKING ONCE SHE STARTS ON ENTRESTO/ GOING THRU PT ASSISTANCE WITH JOHANN VIEIRA 3/24/22   Melva Julio MD   metoprolol succinate (TOPROL XL) 50 MG extended release tablet Take 2 tablets by mouth in the morning and at bedtime  Patient taking differently: Take 50 mg by mouth in the morning and at bedtime  3/23/22   Melva Julio MD   sacubitril-valsartan (ENTRESTO) 24-26 MG per tablet Take 1 tablet by mouth 2 times daily 3/23/22   Melva Julio MD   digoxin Versie Donna) 125 MCG tablet Take 1 tablet by mouth daily 3/16/22   Lily Medel PA-C   loratadine (CLARITIN) 10 MG tablet Take 10 mg by mouth as needed    Historical Provider, MD   hydroCHLOROthiazide (MICROZIDE) 12.5 MG capsule Take 12.5 mg by mouth daily    Historical Provider, MD   apixaban (ELIQUIS) 5 MG TABS tablet Take by mouth 2 times daily    Historical Provider, MD   TURMERIC PO Take by mouth    Historical Provider, MD   calcium carbonate (OSCAL) 500 MG TABS tablet Take 500 mg by mouth daily    Historical Provider, MD   Multiple Vitamins-Minerals (THERAPEUTIC MULTIVITAMIN-MINERALS) tablet Take 1 tablet by mouth daily    Historical Provider, MD   Cholecalciferol (VITAMIN D3) 50 MCG (2000 UT) CAPS Take by mouth    Historical Provider, MD     Additional information:       VITAL SIGNS   Vitals:    04/08/22 1000   BP: (!) 134/91   Pulse: 119   Resp: 19   Temp: 97.8 °F (36.6 °C)   SpO2: 100%       PHYSICAL:   General: No acute distress  HEENT:  Unremarkable for age  Neck: without increased JVD, carotid pulses 2+ bilaterally without bruits  Heart: RRR, S1 & S2 WNL, S4 gallop, without murmurs or rubs    Lungs: Clear to auscultation    Abdomen: BS present, without HSM, masses, or tenderness    Extremities: without C,C,E.  Pulses 2+ bilaterally  Mental Status: Alert & Oriented        PLANNED PROCEDURE   [x]Cath  []PCI                []Pacemaker/AICD  []ISAÍAS             []Cardioversion []Peripheral angiography/PTA  []Other:      SEDATION  Planned agent:[x]Midazolam []Meperidine [x]Sublimaze []Morphine  []Diazepam  []Other:     ASA Classification:  []1 [x]2 []3 []4 []5  Class 1: A normal healthy patient  Class 2: Pt with mild to moderate systemic disease  Class 3: Severe systemic disease or disturbance  Class 4: Severe systemic disorders that are already life threatening. Class 5: Moribund pt with little chances of survival, for more than 24 hours. Mallampati I Airway Classification:   []1 [x]2 []3 []4    [x]Pre-procedure diagnostic studies complete and results available. Comment:    [x]Previous sedation/anesthesia experiences assessed. Comment:    [x]The patient is an appropriate candidate to undergo the planned procedure sedation and anesthesia. (Refer to nursing sedation/analgesia documentation record)  [x]Formulation and discussion of sedation/procedure plan, risks, and expectations with patient and/or responsible adult completed. [x]Patient examined immediately prior to the procedure.  (Refer to nursing sedation/analgesia documentation record)    Mariaa Paredes MD MD [facility] [place]  Electronically signed 4/8/2022 at 10:51 AM

## 2022-04-08 NOTE — PROCEDURES
[address] [place] [address]                            CARDIAC CATHETERIZATION    PATIENT NAME: John Ragland                 :        1960  MED REC NO:   [id number]                           ROOM:       0005  ACCOUNT NO:   [de-identified]                           ADMIT DATE: 2022  PROVIDER:     Janina Boyd M.D.    DATE OF PROCEDURE:  2022    CLINICAL HISTORY AND INDICATION:  A 80-year-old patient with  cardiomyopathy, being evaluated for atrial fibrillation ablation, was  referred by a Dr. Tejal Hernández for cardiac catheterization to evaluate coronary  anatomy given her risk factors. PROCEDURES:  1. Left heart cath with LV-gram.  2.  Coronary angiogram, right and left. 3.  Sedation:  2 of Versed, 25 of fentanyl between 12:00 and 12:30 p.m.  in my presence under my supervision. 4.  Blood loss less than 10 mL. PROCEDURE DETAILS:  Please refer to my catheterization detailed note. HEMODYNAMIC RESULTS AND LEFT VENTRICULOGRAM:  Left ventricular  end-diastolic pressure was 16 mmHg with no significant change before and  after contrast injection. No significant gradient across the aortic  valve to signify aortic stenosis. Left ventricular function was  globally hypokinetic with EF 30% to 35%. No gradient across the aortic  valve to signify aortic stenosis. CORONARY ARTERIOGRAM RESULTS:  1. Left main is patent, gives rise to left anterior descending and left  circumflex artery. 2.  Left anterior descending artery is patent. No significant stenosis. 3.  Left circumflex artery is large, dominant and patent. 4.  Right coronary artery is small, nondominant and is patent. CONCLUSION:  1. No significant coronary artery disease. 2.  Cardiomyopathy likely AFib induced. 4.  No complication.     RECOMMENDATION:  At this point, continuing medical treatment and  treatment and evaluation of the AFib is recommended.         Danielle Zelaya M.D.    D: 04/08/2022 14:37:06       T: 04/08/2022 14:39:53     [email or web address]  Job#: [id number]     Doc#: [id number]    CC:

## 2022-04-08 NOTE — PROGRESS NOTES
Returned to 2E05. Monitor attached showing A-fib. Vasc-band in place to right wrist.  Hemostasis maintained, no bleeding, swelling, or edema noted.   0.9nss infusing with approx 700 ml remaining

## 2022-04-11 ENCOUNTER — TELEPHONE (OUTPATIENT)
Dept: CARDIOLOGY CLINIC | Age: 62
End: 2022-04-11

## 2022-04-11 NOTE — TELEPHONE ENCOUNTER
ASSESSMENT AND RECOMMENDATIONS:  Discussed management of heart failure and atrial fibrillation with the patient. Unfortunately, she had recurrences following her cardioversion and is quite symptomatic. Discontinue diltiazem, increase metoprolol succinate 200 mg daily, continue digoxin and anticoagulation. Start Entresto 24/26 mg twice daily. We will request cardiac catheterization to exclude coronary artery disease and a limited echo and then proceed with rhythm control strategy, likely with catheter ablation with short-term antiarrhythmic drug use. Low-salt diet, lifestyle modification discussed. The patient and her  were understanding of the discussion.     Thank you for allowing me to participate in the care of your patient. Please call me if you have any questions.     **This report has been created using voice recognition software. It may contain minor errors which are inherent in voice recognition technology. **      Mary Holden MD, [facility], [facility] on 3/23/2022 at 10:16 AM         Dr. Eden Lawrence please see cardiac Cath and Echo results-  How would you  Like to proceed?

## 2022-04-12 NOTE — PROGRESS NOTES
[facility] ([facility]  Dept: [phone]  CARDIAC ELECTROPHYSIOLOGY: FOLLOW UP NOTE  PATIENT DEMOGRAPHICS:  Date:   4/13/2022  Patient name:              Neeraj Martin  YOB: 1960  Sex:    female   MRN:   [id number]    PRIMARY CARE PHYSICIAN:   GIANCARLO Lacey - CNP    REFERRING PHYSICIAN:  Andrzej Neff MD    REASON FOR CONSULTATION:  Follow up atrial fibrillation and NICM. HISTORY OF PRESENT ILLNESS:  62/F was diagnosed to have heart failure (LVEF 30%) and atrial fibrillation in 11/2021 (symptoatic since 6/2021). She underwent TTE cardioversion in 2/2022 and had recurrances. I saw her in 3/2022 and optimization of heart failure medication was recommended. She had cardiac catheterization with Dr. Dev Neal (mild CAD). Repeat echo showed unchanged LVEF. Her cardiomyopathy is presumed to be secondary to atrial fibrillation and is here to discuss atrial fibrillation ablation. No complaints today. Intermittent palpitations. No shortness of breath, orthopnea, syncope or lower extremity swelling. Medical Hx: PeAF dx 11/2021, NICM dx 11/2021 (LVEF 30%), HTN and chronic smoking. REVIEW OF SYSTEMS:    Constitutional: Negative for chills and fever  HENT: Negative for congestion, sinus pressure, sneezing and sore throat. Eyes: Negative for pain, discharge, redness and itching. Respiratory: Negative for apnea, cough  Gastrointestinal: Negative for blood in stool, constipation, diarrhea   Endocrine: Negative for cold intolerance, heat intolerance, polydipsia. Genitourinary: Negative for dysuria, enuresis, flank pain and hematuria. Musculoskeletal: Negative for arthralgias, joint swelling and neck pain. Neurological: Negative for numbness and headaches. Psychiatric/Behavioral: Negative for agitation, confusion, decreased concentration and dysphoric mood.       PAST MEDICAL HISTORY:  Past Medical History: Diagnosis Date    Atrial fibrillation ([facility] [address]     Hypertension        PSH:  Past Surgical History:   Procedure Laterality Date    TUBAL LIGATION         FAMILY HISTORY:  Family History   Problem Relation Age of Onset    Heart Disease Father         SOCIAL HISTORY:   and lives with her . 3 children and 2 grandchildren. Smokes a pack of cigarettes for many years. 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   Tobacco Use    Smoking status: Current Every Day Smoker     Packs/day: 0.25    Smokeless tobacco: Never Used   Vaping Use    Vaping Use: Never used   Substance and Sexual Activity    Alcohol use: Yes     Alcohol/week: 12.0 standard drinks     Types: 12 Glasses of wine per week    Drug use: Never    Sexual activity: Not on file   Other Topics Concern    Not on file   Social History Narrative    Not on file     Social Determinants of Health     Financial Resource Strain:     Difficulty of Paying Living Expenses: Not on file   Food Insecurity:     Worried About Running Out of Food in the Last Year: Not on file    Gail of Food in the Last Year: Not on file   Transportation Needs:     Lack of Transportation (Medical): Not on file    Lack of Transportation (Non-Medical):  Not on file   Physical Activity:     Days of Exercise per Week: Not on file    Minutes of Exercise per Session: Not on file   Stress:     Feeling of Stress : Not on file   Social Connections:     Frequency of Communication with Friends and Family: Not on file    Frequency of Social Gatherings with Friends and Family: Not on file    Attends Advent Services: Not on file    Active Member of Clubs or Organizations: Not on file    Attends [employer] or Organization Meetings: Not on file    Marital Status: Not on file   Intimate Partner Violence:     Fear of Current or Ex-Partner: Not on file    Emotionally Abused: Not on file    Physically Abused: Not on file    Sexually Abused: Not on file   Housing Stability:     Unable to Pay for Housing in the Last Year: Not on file    Number of Oswald in the Last Year: Not on file    Unstable Housing in the Last Year: Not on file        ALLERGY HISTORY:  No Known Allergies     MEDICATIONS:  Current Outpatient Medications   Medication Sig Dispense Refill    losartan (COZAAR) 25 MG tablet Take 1 tablet by mouth daily 3/22/3/22 PT IS TO TAKE LOSARTAN 25 MG DAILY AND WILL STOP TAKING ONCE SHE STARTS ON ENTRESTO/ GOING THRU PT ASSISTANCE WITH JOHANN VIEIRA 30 tablet 1    metoprolol succinate (TOPROL XL) 50 MG extended release tablet Take 2 tablets by mouth in the morning and at bedtime (Patient taking differently: Take 50 mg by mouth in the morning and at bedtime ) 180 tablet 3    sacubitril-valsartan (ENTRESTO) 24-26 MG per tablet Take 1 tablet by mouth 2 times daily 60 tablet 3    digoxin (LANOXIN) 125 MCG tablet Take 1 tablet by mouth daily 30 tablet 3    loratadine (CLARITIN) 10 MG tablet Take 10 mg by mouth as needed      hydroCHLOROthiazide (MICROZIDE) 12.5 MG capsule Take 12.5 mg by mouth daily      apixaban (ELIQUIS) 5 MG TABS tablet Take by mouth 2 times daily      TURMERIC PO Take by mouth      calcium carbonate (OSCAL) 500 MG TABS tablet Take 500 mg by mouth daily      Multiple Vitamins-Minerals (THERAPEUTIC MULTIVITAMIN-MINERALS) tablet Take 1 tablet by mouth daily      Cholecalciferol (VITAMIN D3) 50 MCG (2000 UT) CAPS Take by mouth       No current facility-administered medications for this visit. PHYSICAL EXAM:  Vitals:    04/13/22 1120   BP: 115/78   Pulse: 91     Body mass index is 21.91 kg/m². GENERAL: Alert and oriented. No distress. EYES: No pallor or icterus.   ENT: No cyanosis. No thyromegaly or cervical LAP. VESSELS: No jugular venous distension or carotid bruits. HEART: Irregular S1/S2. No murmur, rub or gallop. LUNGS: Clear to auscultation. ABDOMEN: Soft and non-tender. EXTREMITIES: No lower extremity edema. Feet are warm. NEUROLOGICAL: Grossly normal.     LABORATORY DATA AND DIAGNOSTIC DATA:  I have personally reviewed and interpreted the results of the following diagnostic testing    Lab Results   Component Value Date    WBC 8.3 04/08/2022    HGB 16.5 (H) 04/08/2022    HCT 50.7 (H) 04/08/2022     04/08/2022    CHOL 184 04/08/2022    TRIG 123 04/08/2022    HDL 56 04/08/2022     04/08/2022    K 4.1 04/08/2022     04/08/2022    CREATININE 0.6 04/08/2022    BUN 15 04/08/2022    CO2 25 04/08/2022    INR 1.09 04/08/2022      Echocardiogram 4/8/2022: LVEF 30%, LVWT 0.9 cm and ANA 51. Echo 2/22/2022 (ISAÍ[facility]): LVEF 30% and moderate MR. Small pericardial effusion.  ECG 2/17/2022, 2/22/2022, 3/16/2022 and 3/23/2022: AF RVR. Normal QRS.  LHC 4/8/2022: Mild CAD. IMPRESSION:  · PeAF s/p DCCV with recurrences on metoprolol and digoxin. [id number] score= 3 (gender, HTN and CHF) on Apixaban. · NICM, LVEF 30% and NYHA III.  · HTN, controlled. · Chronic smoking, patient counseled. ASSESSMENT AND RECOMMENDATIONS:  The patient's ventricular rate is controlled today. I suspect her cardiomyopathy is possibly related to atrial fibrillation. Discussed options including repeat cardioversion and initiation of antiarrhythmic therapy versus catheter ablation. Patient does not want to be on lifelong antiarrhythmic therapy. She wants to proceed with catheter ablation. Discussed the risk and the benefits. Continue apixaban, metoprolol injection at that time. She will need short-term amiodarone therapy following catheter ablation.   If her LVEF fails to improve beyond 35% following restoration of the sinus rhythm she will need an AICD for primary prevention of sudden cardiac death. Thank you for allowing me to participate in the care of your patient. Please call me if you have any questions. **This report has been created using voice recognition software. It may contain minor errors which are inherent in voice recognition technology. **     Surendra Conner MD, MRCP, [facility], [facility] on 4/13/2022 at 11:45 AM

## 2022-04-13 ENCOUNTER — TELEPHONE (OUTPATIENT)
Dept: CARDIOLOGY CLINIC | Age: 62
End: 2022-04-13

## 2022-04-13 ENCOUNTER — OFFICE VISIT (OUTPATIENT)
Dept: CARDIOLOGY CLINIC | Age: 62
End: 2022-04-13

## 2022-04-13 VITALS
DIASTOLIC BLOOD PRESSURE: 78 MMHG | WEIGHT: 137.8 LBS | SYSTOLIC BLOOD PRESSURE: 115 MMHG | BODY MASS INDEX: 21.63 KG/M2 | HEIGHT: 67 IN | HEART RATE: 91 BPM

## 2022-04-13 DIAGNOSIS — I48.0 PAROXYSMAL ATRIAL FIBRILLATION (HCC): Primary | ICD-10-CM

## 2022-04-13 PROCEDURE — 99214 OFFICE O/P EST MOD 30 MIN: CPT | Performed by: INTERNAL MEDICINE

## 2022-04-13 PROCEDURE — 93000 ELECTROCARDIOGRAM COMPLETE: CPT | Performed by: INTERNAL MEDICINE

## 2022-04-13 NOTE — PATIENT INSTRUCTIONS
You may receive a survey regarding the care you received during your visit. Your input is valuable to us. We encourage you to complete and return your survey. We hope you will choose us in the future for your healthcare needs. Xray Foot AP + Lateral + Oblique, Bilat

## 2022-04-13 NOTE — LETTER
[address] [facility]  [facility] [address] [place] [address]  Phone: [phone]  Fax: [phone]           Corinna Domingo MD          Patient: Rufus Mtz   MR Number: [id number]   YOB: 1960     To Whom it may concern,   Sylvia Somers will be having an ablation done on 04.21.22 and will need to be off of work for 1 week following the procedure. Patient will need to be off of work 04.21.2022-4.28.2022. Return to work on 04.29.2022. Please feel free to contact me with any questions you have concerning Rufus Mtz.     Sincerely,    Jean-Pierre Real MD   Cardiac Electrophysiologist

## 2022-04-13 NOTE — TELEPHONE ENCOUNTER
Procedure: Afib ablation  Date: 04.21.2022  Arrival Time: 9am  Meds to Hold: Metoprolol  3 days, Eliquis the evening prior    Incision check - 04.28.2022 at 130pm.  1 mo fu  With Dr. Mabel Dent- 05.25.22 at 145pm.     Instructions verbalized to the patient. Instructions mailed to the patient. Dr. Mabel Dent -  Please see medications-  Do you agree?

## 2022-04-20 ENCOUNTER — PREP FOR PROCEDURE (OUTPATIENT)
Dept: CARDIOLOGY | Age: 62
End: 2022-04-20

## 2022-04-20 RX ORDER — SODIUM CHLORIDE 9 MG/ML
INJECTION, SOLUTION INTRAVENOUS PRN
Status: CANCELLED | OUTPATIENT
Start: 2022-04-20

## 2022-04-20 RX ORDER — SODIUM CHLORIDE 0.9 % (FLUSH) 0.9 %
5-40 SYRINGE (ML) INJECTION EVERY 12 HOURS SCHEDULED
Status: CANCELLED | OUTPATIENT
Start: 2022-04-20

## 2022-04-20 RX ORDER — SODIUM CHLORIDE 0.9 % (FLUSH) 0.9 %
5-40 SYRINGE (ML) INJECTION PRN
Status: CANCELLED | OUTPATIENT
Start: 2022-04-20

## 2022-04-21 ENCOUNTER — APPOINTMENT (OUTPATIENT)
Dept: CARDIAC CATH/INVASIVE PROCEDURES | Age: 62
End: 2022-04-21

## 2022-04-21 ENCOUNTER — ANESTHESIA (OUTPATIENT)
Dept: CARDIAC CATH/INVASIVE PROCEDURES | Age: 62
End: 2022-04-21

## 2022-04-21 ENCOUNTER — ANESTHESIA EVENT (OUTPATIENT)
Dept: CARDIAC CATH/INVASIVE PROCEDURES | Age: 62
End: 2022-04-21

## 2022-04-21 ENCOUNTER — HOSPITAL ENCOUNTER (OUTPATIENT)
Dept: INPATIENT UNIT | Age: 62
Discharge: HOME | End: 2022-04-21
Attending: INTERNAL MEDICINE | Admitting: INTERNAL MEDICINE

## 2022-04-21 VITALS — OXYGEN SATURATION: 95 % | SYSTOLIC BLOOD PRESSURE: 118 MMHG | DIASTOLIC BLOOD PRESSURE: 73 MMHG | TEMPERATURE: 98.8 F

## 2022-04-21 VITALS
RESPIRATION RATE: 16 BRPM | HEIGHT: 66 IN | TEMPERATURE: 98.1 F | WEIGHT: 137 LBS | SYSTOLIC BLOOD PRESSURE: 115 MMHG | BODY MASS INDEX: 22.02 KG/M2 | DIASTOLIC BLOOD PRESSURE: 69 MMHG | HEART RATE: 74 BPM | OXYGEN SATURATION: 97 %

## 2022-04-21 LAB
ABO: NORMAL
ACTIVATED CLOTTING TIME: 136 SECONDS (ref 1–150)
ACTIVATED CLOTTING TIME: 309 SECONDS (ref 1–150)
ACTIVATED CLOTTING TIME: 351 SECONDS (ref 1–150)
ACTIVATED CLOTTING TIME: 351 SECONDS (ref 1–150)
ACTIVATED CLOTTING TIME: 380 SECONDS (ref 1–150)
ANION GAP SERPL CALCULATED.3IONS-SCNC: 15 MEQ/L (ref 8–16)
ANTIBODY SCREEN: NORMAL
APTT: 35.9 SECONDS (ref 22–38)
BUN BLDV-MCNC: 24 MG/DL (ref 7–22)
CALCIUM SERPL-MCNC: 10.4 MG/DL (ref 8.5–10.5)
CHLORIDE BLD-SCNC: 99 MEQ/L (ref 98–111)
CO2: 25 MEQ/L (ref 23–33)
CREAT SERPL-MCNC: 0.9 MG/DL (ref 0.4–1.2)
EKG ATRIAL RATE: 375 BPM
EKG Q-T INTERVAL: 336 MS
EKG QRS DURATION: 86 MS
EKG QTC CALCULATION (BAZETT): 481 MS
EKG R AXIS: 42 DEGREES
EKG T AXIS: -123 DEGREES
EKG VENTRICULAR RATE: 123 BPM
ERYTHROCYTE [DISTWIDTH] IN BLOOD BY AUTOMATED COUNT: 14.4 % (ref 11.5–14.5)
ERYTHROCYTE [DISTWIDTH] IN BLOOD BY AUTOMATED COUNT: 51.8 FL (ref 35–45)
GFR SERPL CREATININE-BSD FRML MDRD: 64 ML/MIN/1.73M2
GLUCOSE BLD-MCNC: 99 MG/DL (ref 70–108)
HCT VFR BLD CALC: 54.5 % (ref 37–47)
HEMOGLOBIN: 17.7 GM/DL (ref 12–16)
INR BLD: 1.02 (ref 0.85–1.13)
MCH RBC QN AUTO: 31.7 PG (ref 26–33)
MCHC RBC AUTO-ENTMCNC: 32.5 GM/DL (ref 32.2–35.5)
MCV RBC AUTO: 97.5 FL (ref 81–99)
PLATELET # BLD: 203 THOU/MM3 (ref 130–400)
PMV BLD AUTO: 10.8 FL (ref 9.4–12.4)
POTASSIUM SERPL-SCNC: 4.3 MEQ/L (ref 3.5–5.2)
RBC # BLD: 5.59 MILL/MM3 (ref 4.2–5.4)
RH FACTOR: NORMAL
SODIUM BLD-SCNC: 139 MEQ/L (ref 135–145)
WBC # BLD: 9.4 THOU/MM3 (ref 4.8–10.8)

## 2022-04-21 PROCEDURE — 2500000003 HC RX 250 WO HCPCS: Performed by: ANESTHESIOLOGY

## 2022-04-21 PROCEDURE — 93656 COMPRE EP EVAL ABLTJ ATR FIB: CPT | Performed by: INTERNAL MEDICINE

## 2022-04-21 PROCEDURE — C1732 CATH, EP, DIAG/ABL, 3D/VECT: HCPCS

## 2022-04-21 PROCEDURE — 6360000002 HC RX W HCPCS: Performed by: NURSE ANESTHETIST, CERTIFIED REGISTERED

## 2022-04-21 PROCEDURE — 85027 COMPLETE CBC AUTOMATED: CPT

## 2022-04-21 PROCEDURE — 7100000001 HC PACU RECOVERY - ADDTL 15 MIN

## 2022-04-21 PROCEDURE — C1730 CATH, EP, 19 OR FEW ELECT: HCPCS

## 2022-04-21 PROCEDURE — 2720000010 HC SURG SUPPLY STERILE

## 2022-04-21 PROCEDURE — 86923 COMPATIBILITY TEST ELECTRIC: CPT

## 2022-04-21 PROCEDURE — 80048 BASIC METABOLIC PNL TOTAL CA: CPT

## 2022-04-21 PROCEDURE — 2709999900 HC NON-CHARGEABLE SUPPLY

## 2022-04-21 PROCEDURE — 93005 ELECTROCARDIOGRAM TRACING: CPT | Performed by: INTERNAL MEDICINE

## 2022-04-21 PROCEDURE — 2500000003 HC RX 250 WO HCPCS: Performed by: NURSE ANESTHETIST, CERTIFIED REGISTERED

## 2022-04-21 PROCEDURE — C1759 CATH, INTRA ECHOCARDIOGRAPHY: HCPCS

## 2022-04-21 PROCEDURE — 6360000002 HC RX W HCPCS

## 2022-04-21 PROCEDURE — 93656 COMPRE EP EVAL ABLTJ ATR FIB: CPT

## 2022-04-21 PROCEDURE — 86850 RBC ANTIBODY SCREEN: CPT

## 2022-04-21 PROCEDURE — 86901 BLOOD TYPING SEROLOGIC RH(D): CPT

## 2022-04-21 PROCEDURE — 2580000003 HC RX 258: Performed by: INTERNAL MEDICINE

## 2022-04-21 PROCEDURE — C1894 INTRO/SHEATH, NON-LASER: HCPCS

## 2022-04-21 PROCEDURE — 85610 PROTHROMBIN TIME: CPT

## 2022-04-21 PROCEDURE — 3700000001 HC ADD 15 MINUTES (ANESTHESIA)

## 2022-04-21 PROCEDURE — 3700000000 HC ANESTHESIA ATTENDED CARE

## 2022-04-21 PROCEDURE — 85730 THROMBOPLASTIN TIME PARTIAL: CPT

## 2022-04-21 PROCEDURE — 7100000000 HC PACU RECOVERY - FIRST 15 MIN

## 2022-04-21 PROCEDURE — 6360000002 HC RX W HCPCS: Performed by: ANESTHESIOLOGY

## 2022-04-21 PROCEDURE — 2580000003 HC RX 258: Performed by: ANESTHESIOLOGY

## 2022-04-21 PROCEDURE — 86900 BLOOD TYPING SEROLOGIC ABO: CPT

## 2022-04-21 PROCEDURE — 85347 COAGULATION TIME ACTIVATED: CPT

## 2022-04-21 PROCEDURE — 93005 ELECTROCARDIOGRAM TRACING: CPT | Performed by: PHYSICIAN ASSISTANT

## 2022-04-21 PROCEDURE — 36415 COLL VENOUS BLD VENIPUNCTURE: CPT

## 2022-04-21 PROCEDURE — 2500000003 HC RX 250 WO HCPCS

## 2022-04-21 RX ORDER — PANTOPRAZOLE SODIUM 40 MG/1
40 TABLET, DELAYED RELEASE ORAL
Qty: 30 TABLET | Refills: 0 | Status: SHIPPED | OUTPATIENT
Start: 2022-04-21 | End: 2022-05-19 | Stop reason: SDUPTHER

## 2022-04-21 RX ORDER — SODIUM CHLORIDE 0.9 % (FLUSH) 0.9 %
5-40 SYRINGE (ML) INJECTION EVERY 12 HOURS SCHEDULED
Status: DISCONTINUED | OUTPATIENT
Start: 2022-04-21 | End: 2022-04-21 | Stop reason: HOSPADM

## 2022-04-21 RX ORDER — FUROSEMIDE 10 MG/ML
INJECTION INTRAMUSCULAR; INTRAVENOUS PRN
Status: DISCONTINUED | OUTPATIENT
Start: 2022-04-21 | End: 2022-04-21 | Stop reason: SDUPTHER

## 2022-04-21 RX ORDER — FENTANYL CITRATE 50 UG/ML
INJECTION, SOLUTION INTRAMUSCULAR; INTRAVENOUS PRN
Status: DISCONTINUED | OUTPATIENT
Start: 2022-04-21 | End: 2022-04-21 | Stop reason: SDUPTHER

## 2022-04-21 RX ORDER — PHENYLEPHRINE HCL IN 0.9% NACL 1 MG/10 ML
SYRINGE (ML) INTRAVENOUS PRN
Status: DISCONTINUED | OUTPATIENT
Start: 2022-04-21 | End: 2022-04-21 | Stop reason: SDUPTHER

## 2022-04-21 RX ORDER — SODIUM CHLORIDE 9 MG/ML
INJECTION, SOLUTION INTRAVENOUS CONTINUOUS PRN
Status: DISCONTINUED | OUTPATIENT
Start: 2022-04-21 | End: 2022-04-21 | Stop reason: SDUPTHER

## 2022-04-21 RX ORDER — ONDANSETRON 2 MG/ML
4 INJECTION INTRAMUSCULAR; INTRAVENOUS
Status: DISCONTINUED | OUTPATIENT
Start: 2022-04-21 | End: 2022-04-21 | Stop reason: HOSPADM

## 2022-04-21 RX ORDER — VASOPRESSIN 20 U/ML
INJECTION PARENTERAL PRN
Status: DISCONTINUED | OUTPATIENT
Start: 2022-04-21 | End: 2022-04-21 | Stop reason: SDUPTHER

## 2022-04-21 RX ORDER — EPHEDRINE SULFATE/0.9% NACL/PF 50 MG/5 ML
SYRINGE (ML) INTRAVENOUS PRN
Status: DISCONTINUED | OUTPATIENT
Start: 2022-04-21 | End: 2022-04-21 | Stop reason: SDUPTHER

## 2022-04-21 RX ORDER — SODIUM CHLORIDE 0.9 % (FLUSH) 0.9 %
5-40 SYRINGE (ML) INJECTION PRN
Status: DISCONTINUED | OUTPATIENT
Start: 2022-04-21 | End: 2022-04-21 | Stop reason: HOSPADM

## 2022-04-21 RX ORDER — HYDRALAZINE HYDROCHLORIDE 20 MG/ML
10 INJECTION INTRAMUSCULAR; INTRAVENOUS
Status: DISCONTINUED | OUTPATIENT
Start: 2022-04-21 | End: 2022-04-21 | Stop reason: HOSPADM

## 2022-04-21 RX ORDER — SODIUM CHLORIDE 9 MG/ML
INJECTION, SOLUTION INTRAVENOUS PRN
Status: DISCONTINUED | OUTPATIENT
Start: 2022-04-21 | End: 2022-04-21 | Stop reason: HOSPADM

## 2022-04-21 RX ORDER — AMIODARONE HYDROCHLORIDE 200 MG/1
TABLET ORAL
Qty: 120 TABLET | Refills: 1 | Status: SHIPPED | OUTPATIENT
Start: 2022-04-21

## 2022-04-21 RX ORDER — MIDAZOLAM HYDROCHLORIDE 1 MG/ML
INJECTION INTRAMUSCULAR; INTRAVENOUS PRN
Status: DISCONTINUED | OUTPATIENT
Start: 2022-04-21 | End: 2022-04-21 | Stop reason: SDUPTHER

## 2022-04-21 RX ORDER — HEPARIN SODIUM 1000 [USP'U]/ML
INJECTION, SOLUTION INTRAVENOUS; SUBCUTANEOUS PRN
Status: DISCONTINUED | OUTPATIENT
Start: 2022-04-21 | End: 2022-04-21 | Stop reason: SDUPTHER

## 2022-04-21 RX ORDER — SUCCINYLCHOLINE/SOD CL,ISO/PF 200MG/10ML
SYRINGE (ML) INTRAVENOUS PRN
Status: DISCONTINUED | OUTPATIENT
Start: 2022-04-21 | End: 2022-04-21 | Stop reason: SDUPTHER

## 2022-04-21 RX ORDER — ACETAMINOPHEN 325 MG/1
650 TABLET ORAL EVERY 4 HOURS PRN
Status: DISCONTINUED | OUTPATIENT
Start: 2022-04-21 | End: 2022-04-21 | Stop reason: HOSPADM

## 2022-04-21 RX ORDER — LABETALOL 20 MG/4 ML (5 MG/ML) INTRAVENOUS SYRINGE
10
Status: DISCONTINUED | OUTPATIENT
Start: 2022-04-21 | End: 2022-04-21 | Stop reason: HOSPADM

## 2022-04-21 RX ORDER — PROPOFOL 10 MG/ML
INJECTION, EMULSION INTRAVENOUS PRN
Status: DISCONTINUED | OUTPATIENT
Start: 2022-04-21 | End: 2022-04-21 | Stop reason: SDUPTHER

## 2022-04-21 RX ORDER — SODIUM CHLORIDE 9 MG/ML
INJECTION, SOLUTION INTRAVENOUS CONTINUOUS
Status: DISCONTINUED | OUTPATIENT
Start: 2022-04-21 | End: 2022-04-21 | Stop reason: HOSPADM

## 2022-04-21 RX ORDER — PROTAMINE SULFATE 10 MG/ML
INJECTION, SOLUTION INTRAVENOUS PRN
Status: DISCONTINUED | OUTPATIENT
Start: 2022-04-21 | End: 2022-04-21 | Stop reason: SDUPTHER

## 2022-04-21 RX ORDER — ONDANSETRON 2 MG/ML
INJECTION INTRAMUSCULAR; INTRAVENOUS PRN
Status: DISCONTINUED | OUTPATIENT
Start: 2022-04-21 | End: 2022-04-21 | Stop reason: SDUPTHER

## 2022-04-21 RX ADMIN — Medication 10 MG: at 11:12

## 2022-04-21 RX ADMIN — VASOPRESSIN 2 UNITS: 20 INJECTION PARENTERAL at 12:30

## 2022-04-21 RX ADMIN — FENTANYL CITRATE 50 MCG: 50 INJECTION, SOLUTION INTRAMUSCULAR; INTRAVENOUS at 10:42

## 2022-04-21 RX ADMIN — VASOPRESSIN 2 UNITS: 20 INJECTION PARENTERAL at 13:04

## 2022-04-21 RX ADMIN — FENTANYL CITRATE 50 MCG: 50 INJECTION, SOLUTION INTRAMUSCULAR; INTRAVENOUS at 10:33

## 2022-04-21 RX ADMIN — SODIUM CHLORIDE: 9 INJECTION, SOLUTION INTRAVENOUS at 10:02

## 2022-04-21 RX ADMIN — VASOPRESSIN 2 UNITS: 20 INJECTION PARENTERAL at 12:22

## 2022-04-21 RX ADMIN — PROPOFOL 50 MG: 10 INJECTION, EMULSION INTRAVENOUS at 10:45

## 2022-04-21 RX ADMIN — ONDANSETRON 4 MG: 2 INJECTION INTRAMUSCULAR; INTRAVENOUS at 13:40

## 2022-04-21 RX ADMIN — PROTAMINE SULFATE 30 MG: 10 INJECTION, SOLUTION INTRAVENOUS at 13:43

## 2022-04-21 RX ADMIN — Medication 200 MCG: at 10:51

## 2022-04-21 RX ADMIN — HEPARIN SODIUM 2000 UNITS: 1000 INJECTION, SOLUTION INTRAVENOUS; SUBCUTANEOUS at 12:52

## 2022-04-21 RX ADMIN — PROPOFOL 50 MG: 10 INJECTION, EMULSION INTRAVENOUS at 11:38

## 2022-04-21 RX ADMIN — SODIUM CHLORIDE: 9 INJECTION, SOLUTION INTRAVENOUS at 11:29

## 2022-04-21 RX ADMIN — VASOPRESSIN 2 UNITS: 20 INJECTION PARENTERAL at 13:35

## 2022-04-21 RX ADMIN — Medication 10 MG: at 11:08

## 2022-04-21 RX ADMIN — Medication 200 MCG: at 10:46

## 2022-04-21 RX ADMIN — Medication 10 MG: at 11:29

## 2022-04-21 RX ADMIN — VASOPRESSIN 2 UNITS: 20 INJECTION PARENTERAL at 11:54

## 2022-04-21 RX ADMIN — Medication 10 MG: at 11:35

## 2022-04-21 RX ADMIN — Medication 10 MG: at 13:43

## 2022-04-21 RX ADMIN — PROPOFOL 120 MG: 10 INJECTION, EMULSION INTRAVENOUS at 10:33

## 2022-04-21 RX ADMIN — SODIUM CHLORIDE: 9 INJECTION, SOLUTION INTRAVENOUS at 10:27

## 2022-04-21 RX ADMIN — MIDAZOLAM 2 MG: 1 INJECTION INTRAMUSCULAR; INTRAVENOUS at 10:27

## 2022-04-21 RX ADMIN — Medication 100 MCG: at 11:20

## 2022-04-21 RX ADMIN — Medication 200 MCG: at 10:56

## 2022-04-21 RX ADMIN — Medication 100 MG: at 10:33

## 2022-04-21 RX ADMIN — PROPOFOL 30 MG: 10 INJECTION, EMULSION INTRAVENOUS at 10:42

## 2022-04-21 RX ADMIN — PROPOFOL 50 MG: 10 INJECTION, EMULSION INTRAVENOUS at 11:37

## 2022-04-21 RX ADMIN — FUROSEMIDE 20 MG: 10 INJECTION, SOLUTION INTRAMUSCULAR; INTRAVENOUS at 13:58

## 2022-04-21 RX ADMIN — HEPARIN SODIUM 5000 UNITS: 1000 INJECTION, SOLUTION INTRAVENOUS; SUBCUTANEOUS at 11:46

## 2022-04-21 RX ADMIN — HEPARIN SODIUM 10000 UNITS: 1000 INJECTION, SOLUTION INTRAVENOUS; SUBCUTANEOUS at 11:49

## 2022-04-21 RX ADMIN — Medication 300 MCG: at 11:03

## 2022-04-21 ASSESSMENT — PULMONARY FUNCTION TESTS
PIF_VALUE: 10
PIF_VALUE: 10
PIF_VALUE: 14
PIF_VALUE: 1
PIF_VALUE: 11
PIF_VALUE: 12
PIF_VALUE: 11
PIF_VALUE: 3
PIF_VALUE: 14
PIF_VALUE: 10
PIF_VALUE: 10
PIF_VALUE: 11
PIF_VALUE: 10
PIF_VALUE: 11
PIF_VALUE: 14
PIF_VALUE: 11
PIF_VALUE: 11
PIF_VALUE: 18
PIF_VALUE: 11
PIF_VALUE: 14
PIF_VALUE: 11
PIF_VALUE: 11
PIF_VALUE: 13
PIF_VALUE: 13
PIF_VALUE: 11
PIF_VALUE: 10
PIF_VALUE: 11
PIF_VALUE: 13
PIF_VALUE: 11
PIF_VALUE: 11
PIF_VALUE: 1
PIF_VALUE: 11
PIF_VALUE: 10
PIF_VALUE: 11
PIF_VALUE: 11
PIF_VALUE: 10
PIF_VALUE: 11
PIF_VALUE: 10
PIF_VALUE: 11
PIF_VALUE: 10
PIF_VALUE: 11
PIF_VALUE: 10
PIF_VALUE: 10
PIF_VALUE: 11
PIF_VALUE: 14
PIF_VALUE: 10
PIF_VALUE: 14
PIF_VALUE: 11
PIF_VALUE: 22
PIF_VALUE: 11
PIF_VALUE: 1
PIF_VALUE: 11
PIF_VALUE: 14
PIF_VALUE: 13
PIF_VALUE: 11
PIF_VALUE: 1
PIF_VALUE: 13
PIF_VALUE: 11
PIF_VALUE: 10
PIF_VALUE: 14
PIF_VALUE: 11
PIF_VALUE: 11
PIF_VALUE: 14
PIF_VALUE: 14
PIF_VALUE: 10
PIF_VALUE: 11
PIF_VALUE: 10
PIF_VALUE: 11
PIF_VALUE: 11
PIF_VALUE: 13
PIF_VALUE: 11
PIF_VALUE: 0
PIF_VALUE: 11
PIF_VALUE: 14
PIF_VALUE: 11
PIF_VALUE: 11
PIF_VALUE: 10
PIF_VALUE: 10
PIF_VALUE: 11
PIF_VALUE: 1
PIF_VALUE: 11
PIF_VALUE: 11
PIF_VALUE: 13
PIF_VALUE: 2
PIF_VALUE: 22
PIF_VALUE: 11
PIF_VALUE: 4
PIF_VALUE: 10
PIF_VALUE: 21
PIF_VALUE: 14
PIF_VALUE: 11
PIF_VALUE: 11
PIF_VALUE: 14
PIF_VALUE: 13
PIF_VALUE: 11
PIF_VALUE: 10
PIF_VALUE: 11
PIF_VALUE: 14
PIF_VALUE: 11
PIF_VALUE: 10
PIF_VALUE: 11
PIF_VALUE: 10
PIF_VALUE: 13
PIF_VALUE: 11
PIF_VALUE: 11
PIF_VALUE: 10
PIF_VALUE: 11
PIF_VALUE: 11
PIF_VALUE: 1
PIF_VALUE: 10
PIF_VALUE: 11
PIF_VALUE: 14
PIF_VALUE: 13
PIF_VALUE: 11
PIF_VALUE: 11
PIF_VALUE: 10
PIF_VALUE: 11
PIF_VALUE: 1
PIF_VALUE: 13
PIF_VALUE: 11
PIF_VALUE: 10
PIF_VALUE: 11
PIF_VALUE: 12
PIF_VALUE: 11
PIF_VALUE: 10
PIF_VALUE: 11
PIF_VALUE: 11
PIF_VALUE: 10
PIF_VALUE: 10
PIF_VALUE: 13
PIF_VALUE: 11
PIF_VALUE: 14
PIF_VALUE: 11
PIF_VALUE: 14
PIF_VALUE: 11
PIF_VALUE: 11

## 2022-04-21 ASSESSMENT — LIFESTYLE VARIABLES: SMOKING_STATUS: 1

## 2022-04-21 NOTE — PROCEDURES
[address] [facility]  Dept: [phone]  CARDIAC ELECTROPHYSIOLOGY: PROCEDURE NOTE  Patients Demographics:  Date:   4/21/2022  Patient name:              Sue Green  YOB: 1960  Sex:    female   MRN:   [id number]    Cardiac Electrophysiologist:   Thien Foster MD, [facility], [facility], [facility]    Primary Care Provider:     GIANCARLO Gilliam - CNP     Cardiologist:  Elayne Gilford, MD    Procedure Planned:  Atrial fibrillation catheter ablation. Indication/s for the Procedure:  Drug refractory symptomatic persistent atrial fibrillation. Brief Clinical Summary:  62/F with PeAF who failed medical therapy and DCCV presents electively for RF catheter ablation. Uninterrupted anticoagulation. Medical Hx: NICM, EF 30% (presumed AF related), HTN and chronic smoking. Procedure/s Performed:   Left and right atrial pacing and recording.  3D electro anatomical mapping (CARTO).  Intracardiac echocardiography (ICE).  Right and left cardiac catheterization with ICE guided transseptal puncture.  Left and right pulmonary vein isolation in pairs.  Left atrial posterior wall isolation.  Electric cardioversion for atrial fibrillation. Description of the Procedure: The patient was brought to electrophysiology laboratory in a fasting and non-sedated state. General anesthesia was administered by anesthesia Service. She was prepped and draped in the usual sterile fashion. Lidocaine, 2% was injected into femoral regions and right side of the neck for local anesthesia. Vascular access was obtained under ultrasound guidance and hemostatic short sheaths placed over the guidewires (9-St Helenian and 7-St Helenian in the right femoral vein, 11-St Helenian in left femoral vein, and 8-St Helenian in the right internal jugular vein).  A 3D geometry of right atrium and coronary sinus was created using 3.5 mm irrigated tip contact-sense catheter (D/F STSF). A dual site 7-French catheter (Medical TZ) was inserted through the right internal jugular vein and positioned in the coronary sinus with proximal electrodes resting along high right atrium. A 10 French phased array ultrasound catheter was advanced into right atrium via left femoral vein for intracardiac echocardiography. Initial scan of the heart showed no YOLI clots or pericardial effusion. A single transseptal puncture was performed under ICE guidance, using 8.5-French steerable sheath (WeGush)  and 98-cm Brockenbrough needle. A 5,000  units of heparin bolus was administered before and a 10,00 units bolus soon after transseptal puncture. Subsequently, ACT was monitored every 20 minutes. Heparin boluses were given as needed to maintain an ACT between 300-350 seconds throughout the left heart catheterization. The 6-French multipolar irrigated mapping catheter (Tripda) was advanced into left atrium through the Visigo sheath. At baseline the patient was in atrial fibrillation with rapid ventricular rate. Electric cardioversion was performed by synchronized 50 Joules (unsuccessful) followed by 200 Joules (successful) shocks. Left atrial voltage map was created during coronary sinus pacing ( normal LA voltage). Three pulmonary veins were noted on the right side (confirmed with ICE) and right upper and right middle pulmonary veins had a common ostium. Pulmonary veins were mapped at baseline, during ablation and 20 minutes after initial isolation. Ablation index guided wide antral circumferential radiofrequency ablation was performed around the pulmonary veins in pairs during coronary sinus pacing. After the first pass all five veins were isolated. The entrance and exit blocks were confirmed in all veins during coronary sinus and pulmonary vein pacing respectively.  A box lesion was created to isolate left atrial posterior wall by joining to right and left circumferential lesions sets by a superiior and inferior linear ablation lines. Entrance and exit block in posterior wall confirmed. The esophageal temperature was monitored throughout the procedure. The ablation was stopped with 0.5 degree Celsius rise in esophageal temperature. The maximum recorded esophageal temperature during ablation was 37.5 degree Celsius. Adenosine boluses were administered intravenously and no dormant conduction noted. Programmed stimulation was repeated and no arrhyhtmia induced. Post ablation, sinus cycle length was 809 ms,  ms, QRSd 101 ms, QT interval 435 ms, AH 81, HV 42, AV Wenckebach 320 ms and AV node /600. At the end of the procedure imaging with ICE showed no left atrial clots or pericardial effusion. The catheters and sheaths were removed and hemostasis achieved by figure of 8 stiches (groins) and manual compression. The access sites were covered by light dressing. Ablation Summary:  Total number of RF lesions 123 and total RF time 17.49 minutes. Maximum energy used 40 cortez along the posterior wall and the roof (Ablation Index 300-350) and CTI and 50 cortez along the anterior wall (Ablation Index 400-450). The mean catheter tip temperature during the RF application was 33 degrees C. Medications:    General anesthesia   Heparin 17,000 units IV.  Protamine 30 mg IV.  Lasix 20 mg IV. Fluoroscopic Time:  Zero. Blood Loss (estimated):  Less than 50 cc. Fluid Balance (IN and OUT):  2100/0 cc. Complications:  None. Summary:   Successful electric isolation of pulmonary veins using radiofrequency catheter ablation.  Left atrial posterior wall isolation (box lesion).  Synchronized electric cardioversion for atrial fibrillation. .    Normal AV node and His-Purkinje functions. Recommendations:    Observation for 3 hours.  Resume antiarrhythmic drugs, metoprolol and anticoagulation.  Proton pump inhibitors for a month.  Post operative care per protocol. Electronically signed by Paresh Madrid MD, Shaheen Moe on 4/21/2022 at 2:14 PM

## 2022-04-21 NOTE — FLOWSHEET NOTE
Received from recovery. Pt alert denies pain. Oxygen at 2l/nc/min cont. Bilateral groin sites stable. Figure 8 sutures intact. Right jugular site stable. Pt aware to keep both legs still and to not cross legs. Taking sips of water. Family at bedside. Resting with easy resp. 0.69 normal saline cont.

## 2022-04-21 NOTE — PROGRESS NOTES
1409: pt arrives to pacu. Pt on 4l nasal cannula. Pt alert and awake. VSS. Respirations unlabored   9968: pt resting in bed   1425: pt resting in bed   1435: pt resting in bed. Pt denies pain   1445: report called to 2E ALETHA Pelaez. Dr. Siddiqi Mouse ok with blood pressures   1450: pt meets discharge criteria from pacu.  Pt transported to 2E

## 2022-04-21 NOTE — FLOWSHEET NOTE
Dangled and up to br. Voided. Ambulated in halls. Bilateral venous groin sites stable.  present. Denies pain orneeds.

## 2022-04-21 NOTE — PLAN OF CARE
Problem: Discharge Planning  Goal: Discharge to home or other facility with appropriate resources  Outcome: Progressing   Care plan reviewed with patient and . Patient and  verbalize understanding of the plan of care and contribute to goal setting.

## 2022-04-21 NOTE — FLOWSHEET NOTE
Discharge instructions with AVS review completed.  present. Questions and concerns addressed. Bilateral groin and neck sites stable. Iv fluids stopped. Iv catheter removed. telemetry off. Preparing for discharge to home post atrial fib ablation.

## 2022-04-21 NOTE — PLAN OF CARE
Problem: Discharge Planning  Goal: Discharge to home or other facility with appropriate resources  4/21/2022 1738 by Dav Haney RN  Outcome: Completed  4/21/2022 1007 by Sherryle Holter, RN  Outcome: Progressing

## 2022-04-21 NOTE — PROGRESS NOTES
Pt admitted to  2E05 ambulatory for At Fib ablation  Pt NPO. Patient accompanied by . Vital signs obtained. Assessment and data collection initiated. Oriented to room. Policies and procedures for 2E explained   All questions answered with no further questions at this time. Fall prevention and safety precautions discussed with patient.    1023  Pt to cath lab per bed

## 2022-04-21 NOTE — FLOWSHEET NOTE
Attempted to stand pt at bedside. Left femoral venous site began to ooze. Pt returned to bed. Left femoral site manual pressure with quickclot for 5 minutes. Site dressed with gauze and tegaderm . Right femoral venous site stable. Right IJ site stable. External female catheter removed.

## 2022-04-21 NOTE — BRIEF OP NOTE
Brief Postoperative Note    Date:   4/21/2022  Patient name: Jesús Arredondo  YOB: 1960  Sex: female   MRN:   [id number]    PCP: GIANCARLO Rainey CNP     Procedure: Atrial fibrillation ablation. Pre-Op Diagnosis: Atrial fibrillation. Post-Op Diagnosis: Same    Surgeon: Dotty Stanley MD, MRCP, [facility]    Assistant: Miko Townsend. Anesthesia/sedation: General and local lidocaine. Estimated Blood Loss (mL): less than 50     Complications: None    Recommendations:   See orders in Epic.  Bed rest for 3 hours.  Watch access site/s for bleeding or swelling.  Hold pressure if bleeding or swelling.  Ambulate 2 hour after suture/sheath removal.   Remove Norris's catheter (if in place) once patient ambulates.  Stop IV fluids once patient starts eating.  Resume home medications as tonight.  Follow up in 4 weeks in EP clinic.              Electronically signed by Kandi Gaspar MD, Klever Alonzo on 4/21/2022 at 2:04 PM

## 2022-04-21 NOTE — H&P
[facility] ()  [address] [facility] [address]  H&P and SEDATION/ANALGESIA     Patient demographics:  Date:   4/21/2022  Patient name: Cayden Arango  YOB: 1960  Sex: female   MRN:   [id number]    Reason for admission or planned procedure:  Atrial fibrillation ablation. Code Status: Full Code    Consent:The risk and benefits of Afib catheter ablation including bleeding, vascular complications, pericardial tamponade requiring emergency pericardiocentesis or emergency sternotomy and placement of pericardial drain or emergency sternotomy, phrenic nerve injury, pulmonary vein stenosis, atrio-esophageal fistula,  stroke, MI, death, potential exposure to radiation and recurrent atrial tachy-arrhythmia requiring further ablations and/or initiation of AAD therapy were discussed with the patient. SHe verbalized understanding of the discussion. Her questions were answered. The patient wishes to proceed. Brief clinical summary:  62/F with PeAF who failed medical therapy and DCCV presents electively for RF catheter ablation. Uninterrupted anticoagulation. Medical Hx: NICM, EF 30% (presumed AF related), HTN and chronic smoking. Past Medical History:  Past Medical History:   Diagnosis Date    Atrial fibrillation ([address]     Hypertension        Past Surgical History:  Past Surgical History:   Procedure Laterality Date    TUBAL LIGATION          Allergies:    Allergies as of 04/21/2022    (No Known Allergies)     Additional information:       Medications     Current Facility-Administered Medications:     0.9 % sodium chloride infusion, , IntraVENous, PRN, Samantha Romberg, PA-C    sodium chloride flush 0.9 % injection 5-40 mL, 5-40 mL, IntraVENous, 2 times per day, Samantha Romberg, PA-YADY    sodium chloride flush 0.9 % injection 5-40 mL, 5-40 mL, IntraVENous, PRN, Samantha Romberg, PA-YADY    0.9 % sodium chloride infusion, , IntraVENous, Continuous, Hank Elder MD, Last Rate: 50 mL/hr at 04/21/22 1002, New Bag at 04/21/22 1002  Prior to Admission medications    Medication Sig Start Date End Date Taking? Authorizing Provider   metoprolol succinate (TOPROL XL) 50 MG extended release tablet Take 2 tablets by mouth in the morning and at bedtime 3/23/22   Hank Elder MD   sacubitril-valsartan [facility]) 24-26 MG per tablet Take 1 tablet by mouth 2 times daily 3/23/22   Hank Elder MD   digoxin [facility]) 125 MCG tablet Take 1 tablet by mouth daily 3/16/22   Leora Ann PA-C   loratadine (CLARITIN) 10 MG tablet Take 10 mg by mouth as needed    Historical Provider, MD   hydroCHLOROthiazide (MICROZIDE) 12.5 MG capsule Take 12.5 mg by mouth daily    Historical Provider, MD   apixaban (ELIQUIS) 5 MG TABS tablet Take by mouth 2 times daily    Historical Provider, MD   TURMERIC PO Take by mouth    Historical Provider, MD   calcium carbonate (OSCAL) 500 MG TABS tablet Take 500 mg by mouth daily    Historical Provider, MD   Multiple Vitamins-Minerals (THERAPEUTIC MULTIVITAMIN-MINERALS) tablet Take 1 tablet by mouth daily    Historical Provider, MD   Cholecalciferol (VITAMIN D3) 50 MCG (2000 UT) CAPS Take by mouth    Historical Provider, MD     Aspirin  [] 81 mg  [] 325 mg  [x] None  Antiplatelet drug therapy use last 5 days  [x]No []Yes  Coumadin Use Last 5 Days [x]No []Yes  Other anticoagulant use last 5 days  []No [x]Yes  Additional information: Per medical records       Vitals:   Vitals:    04/21/22 0934   BP: 100/82   Pulse: 119   Resp: 16   Temp: 98.4 °F (36.9 °C)   SpO2:        Physical Examination:   GENERAL: Alert and oriented. No distress. EYES: No pallor or icterus. ENT: No central cyanosis. VESSELS: No jugular venous distension or carotid bruits. HEART: Irregular S1/S2. No murmur, rub or gallop. LUNGS: Clear to auscultation. ABDOMEN: Soft and non-tender. EXTREMITIES: No lower extremity edema. Feet are warm. NEUROLOGICAL: Grossly intact. Procedure Plannd:   [x] AF ablation [] Atrial Flutter ablation [] SVT ablation [] PVC/VT ablation [x] EP study  [] Pacemaker implantation [] AICD implantation [] BiV PM/ICD implantation   [] Generator change [] Loop recorder implantation [] Loop recorder explantation. [] Micra leadless pacemaker implantation. [] His bundle/Left bundle pacemaker implantation. [] Lead revision. [] Pocket Revision. [] ISAÍAS. [] Cardioversion    []Other:       Planned Sedation/Analgesia:  [x] General anesthesia.   [] Midazolam [] Fentanyl [] Propofol [] Propofol with anesthesia team.    []Midazolam []Meperidine []Sublimaze []Morphine [] Diazepam    []Other:           MD MD Kaykay Ibrahim  Electronically signed 4/21/2022 at 10:24 AM

## 2022-04-21 NOTE — ANESTHESIA PRE PROCEDURE
Department of Anesthesiology  Preprocedure Note       Name:  Rfuus Mtz   Age:  65 y.o.  :  1960                                          MRN:  [id number]         Date:  2022      Surgeon: * No surgeons listed *    Procedure: * No procedures listed *    Medications prior to admission:   Prior to Admission medications    Medication Sig Start Date End Date Taking?  Authorizing Provider   metoprolol succinate (TOPROL XL) 50 MG extended release tablet Take 2 tablets by mouth in the morning and at bedtime 3/23/22   Suni Flanagan MD   sacubitril-valsartan [facility]) 24-26 MG per tablet Take 1 tablet by mouth 2 times daily 3/23/22   Suni Flanagan MD   [facility]) 125 MCG tablet Take 1 tablet by mouth daily 3/16/22   Liborio Castillo PA-C   loratadine (CLARITIN) 10 MG tablet Take 10 mg by mouth as needed    Historical Provider, MD   hydroCHLOROthiazide (MICROZIDE) 12.5 MG capsule Take 12.5 mg by mouth daily    Historical Provider, MD   apixaban (ELIQUIS) 5 MG TABS tablet Take by mouth 2 times daily    Historical Provider, MD   TURMERIC PO Take by mouth    Historical Provider, MD   calcium carbonate (OSCAL) 500 MG TABS tablet Take 500 mg by mouth daily    Historical Provider, MD   Multiple Vitamins-Minerals (THERAPEUTIC MULTIVITAMIN-MINERALS) tablet Take 1 tablet by mouth daily    Historical Provider, MD   Cholecalciferol (VITAMIN D3) 50 MCG (2000) CAPS Take by mouth    Historical Provider, MD       Current medications:    Current Facility-Administered Medications   Medication Dose Route Frequency Provider Last Rate Last Admin    0.9 % sodium chloride infusion   IntraVENous PRN Magalis Scott PA-C        sodium chloride flush 0.9 % injection 5-40 mL  5-40 mL IntraVENous 2 times per day Magalis Scott PA-C        sodium chloride flush 0.9 % injection 5-40 mL  5-40 mL IntraVENous PRN Magalis Scott PA-C        0.9 % sodium chloride infusion   IntraVENous Continuous Bob Cargo Rosario Lua MD 50 mL/hr at 04/21/22 1002 New Bag at 04/21/22 1002     Facility-Administered Medications Ordered in Other Encounters   Medication Dose Route Frequency Provider Last Rate Last Admin    0.9 % sodium chloride infusion   IntraVENous Continuous PRN Neel Go MD   New Bag at 04/21/22 1027    midazolam (VERSED) injection   IntraVENous PRN Lolly Hughes MD   2 mg at 04/21/22 1027    propofol injection   IntraVENous PRN Lolly Hughes MD   50 mg at 04/21/22 1045    succinylcholine (ANECTINE) injection   IntraVENous PRN Lolly Hughes MD   100 mg at 04/21/22 1033    fentaNYL (SUBLIMAZE) injection   IntraVENous PRN Lolly Hughes MD   50 mcg at 04/21/22 1042    phenylephrine (NEVIN-SYNEPHRINE) 1 MG/10ML prefilled syringe   IntraVENous PRN Lolly Hughes MD   200 mcg at 04/21/22 1056       Allergies:  No Known Allergies    Problem List:    Patient Active Problem List   Diagnosis Code    Primary hypertension I10    Paroxysmal atrial fibrillation (HCC) I48.0    Cardiomyopathy ([facility] [address] I42.9       Past Medical History:        Diagnosis Date    Atrial fibrillation ([facility] [address]     History of cardiac cath 04/2022    Hypertension        Past Surgical History:        Procedure Laterality Date    TUBAL LIGATION         Social History:    Social History     Tobacco Use    Smoking status: Current Every Day Smoker     Packs/day: 0.25    Smokeless tobacco: Never Used    Tobacco comment: 4 -5 cigs per day   Substance Use Topics    Alcohol use:  Yes     Alcohol/week: 12.0 standard drinks     Types: 12 Glasses of wine per week                                Ready to quit: Not Answered  Counseling given: Not Answered  Comment: 4 -5 cigs per day      Vital Signs (Current):   Vitals:    04/21/22 0932 04/21/22 0934 04/21/22 1009   BP: 110/78 100/82    Pulse:  119    Resp:  16    Temp:  98.4 °F (36.9 °C)    TempSrc:  Oral    SpO2: 98%     Weight:   137 lb (62.1 kg)   Height:   5' 6\" (1.676 m) BP Readings from Last 3 Encounters:   04/21/22 100/82   04/21/22 (!) 64/48   04/13/22 115/78       NPO Status:                                                                                 BMI:   Wt Readings from Last 3 Encounters:   04/21/22 137 lb (62.1 kg)   04/13/22 137 lb 12.8 oz (62.5 kg)   04/08/22 143 lb (64.9 kg)     Body mass index is 22.11 kg/m². CBC:   Lab Results   Component Value Date    WBC 9.4 04/21/2022    RBC 5.59 04/21/2022    HGB 17.7 04/21/2022    HCT 54.5 04/21/2022    MCV 97.5 04/21/2022     04/21/2022       CMP:   Lab Results   Component Value Date     04/21/2022    K 4.3 04/21/2022    CL 99 04/21/2022    CO2 25 04/21/2022    BUN 24 04/21/2022    CREATININE 0.9 04/21/2022    LABGLOM 64 04/21/2022    GLUCOSE 99 04/21/2022    CALCIUM 10.4 04/21/2022       POC Tests: No results for input(s): POCGLU, POCNA, POCK, POCCL, POCBUN, POCHEMO, POCHCT in the last 72 hours. Coags:   Lab Results   Component Value Date    INR 1.02 04/21/2022    APTT 35.9 04/21/2022       HCG (If Applicable): No results found for: PREGTESTUR, PREGSERUM, HCG, HCGQUANT     ABGs: No results found for: PHART, PO2ART, HUX3XQK, QRJ4EES, BEART, [id number]     Type & Screen (If Applicable):  Lab Results   Component Value Date    LABRH NEG 04/21/2022       Drug/Infectious Status (If Applicable):  No results found for: HIV, HEPCAB    COVID-19 Screening (If Applicable): No results found for: COVID19        Anesthesia Evaluation   no history of anesthetic complications:   Airway: Mallampati: I  TM distance: >3 FB   Neck ROM: full  Mouth opening: > = 3 FB Dental:    (+) upper dentures      Pulmonary:normal exam    (+) COPD:  current smoker          Patient did not smoke on day of surgery.                  Cardiovascular:    (+) hypertension:, dysrhythmias: atrial fibrillation,       ECG reviewed  Rhythm: irregular  Rate: abnormal  Echocardiogram reviewed                  Neuro/Psych:   Negative Neuro/Psych ROS GI/Hepatic/Renal: Neg GI/Hepatic/Renal ROS            Endo/Other: Negative Endo/Other ROS             Pt had no PAT visit       Abdominal:             Vascular: negative vascular ROS. Other Findings:             Anesthesia Plan      general     ASA 3       Induction: intravenous. MIPS: Postoperative opioids intended and Prophylactic antiemetics administered. Anesthetic plan and risks discussed with patient. Plan discussed with CRNA.                   Margaret Mccarty MD   4/21/2022

## 2022-04-22 LAB
EKG ATRIAL RATE: 74 BPM
EKG P AXIS: 58 DEGREES
EKG P-R INTERVAL: 166 MS
EKG Q-T INTERVAL: 422 MS
EKG QRS DURATION: 90 MS
EKG QTC CALCULATION (BAZETT): 468 MS
EKG R AXIS: 24 DEGREES
EKG T AXIS: 159 DEGREES
EKG VENTRICULAR RATE: 74 BPM

## 2022-04-22 PROCEDURE — 93010 ELECTROCARDIOGRAM REPORT: CPT | Performed by: INTERNAL MEDICINE

## 2022-04-22 NOTE — PROGRESS NOTES
The patient underwent atrial fibrillation ablation. Gracie Tate She was seen and examined post operatively. Imaging and graphics reviewed. The vascular access site soft and non-tender. No major complaints. Hemodynamically stable, tolerating food and ambulating. No acute concerns. Postoperative care and follow up discussed. Contact information provided. Can be safely discharged home. Resume all medications with following chnages*. Patient is in agreement.  Discussed with nursing staff,     * Amiodarone load and maintenance as prescribed and pantoprazol 40 mg PO for a month

## 2022-04-22 NOTE — ANESTHESIA POSTPROCEDURE EVALUATION
Department of Anesthesiology  Postprocedure Note    Patient: Margarita Pena  MRN: [id number]  YOB: 1960  Date of evaluation: 4/22/2022  Time:  7:01 AM     Procedure Summary     Date: 04/21/22 Room / Location: [facility] CATH LAB    Anesthesia Start: 1027 Anesthesia Stop: 1416    Procedure: STR ABLATION WITH ANESTHESIA Diagnosis:     Scheduled Providers:  Responsible Provider: Yun Chang MD    Anesthesia Type: general ASA Status: 3          Anesthesia Type: general    Norma Phase I: Norma Score: 9    Norma Phase II:      Last vitals: Reviewed and per EMR flowsheets.        Anesthesia Post Evaluation    Patient location during evaluation: PACU  Patient participation: complete - patient participated  Level of consciousness: awake and alert  Airway patency: patent  Nausea & Vomiting: no nausea  Complications: no  Cardiovascular status: blood pressure returned to baseline and hemodynamically stable  Respiratory status: acceptable and spontaneous ventilation  Hydration status: euvolemic

## 2022-04-28 ENCOUNTER — NURSE ONLY (OUTPATIENT)
Dept: CARDIOLOGY CLINIC | Age: 62
End: 2022-04-28

## 2022-04-28 NOTE — PROGRESS NOTES
..Pt here post at fib  for incision check. Bilateral groin, and right jugular sites free of hematoma, hard knots, redness, or drainage. mild bruising, numbness or tingling noted. Aware to call the office in anything changes.

## 2022-05-19 ENCOUNTER — OFFICE VISIT (OUTPATIENT)
Dept: CARDIOLOGY CLINIC | Age: 62
End: 2022-05-19

## 2022-05-19 VITALS
HEART RATE: 52 BPM | SYSTOLIC BLOOD PRESSURE: 118 MMHG | HEIGHT: 67 IN | WEIGHT: 143 LBS | BODY MASS INDEX: 22.44 KG/M2 | DIASTOLIC BLOOD PRESSURE: 64 MMHG

## 2022-05-19 DIAGNOSIS — I42.0 DILATED CARDIOMYOPATHY (HCC): Primary | ICD-10-CM

## 2022-05-19 DIAGNOSIS — I48.0 PAROXYSMAL ATRIAL FIBRILLATION (HCC): ICD-10-CM

## 2022-05-19 PROCEDURE — 99213 OFFICE O/P EST LOW 20 MIN: CPT | Performed by: NUCLEAR MEDICINE

## 2022-05-19 RX ORDER — PANTOPRAZOLE SODIUM 40 MG/1
40 TABLET, DELAYED RELEASE ORAL
Qty: 90 TABLET | Refills: 3 | Status: SHIPPED | OUTPATIENT
Start: 2022-05-19

## 2022-05-19 NOTE — PROGRESS NOTES
[address]  [address] [place] [address]  Dept: [phone]  Dept Fax: [phone]  Loc: [phone]    Visit Date: 5/19/2022    Margarita Pena is a 65 y.o. female who presents todayfor:  Chief Complaint   Patient presents with    Follow-up    Atrial Fibrillation    Cardiomyopathy   had a fib ablation lately   Known A fib induced cardiomyopathy'  Cath was okay   No chest pain  No changes other wise  Bp is stable  No dizziness  No syncope        HPI:  HPI  Past Medical History:   Diagnosis Date    Atrial fibrillation ([address]     History of cardiac cath 04/2022    Hypertension       Past Surgical History:   Procedure Laterality Date    TUBAL LIGATION       Family History   Problem Relation Age of Onset    Heart Disease Father      Social History     Tobacco Use    Smoking status: Current Every Day Smoker     Packs/day: 0.25    Smokeless tobacco: Never Used    Tobacco comment: 4 -5 cigs per day   Substance Use Topics    Alcohol use: Yes     Alcohol/week: 12.0 standard drinks     Types: 12 Glasses of wine per week      Current Outpatient Medications   Medication Sig Dispense Refill    amiodarone (CORDARONE) 200 MG tablet 400 mg twice daily for a week, then 200 mg bid for a week and then 200 g daily.  120 tablet 1    pantoprazole (PROTONIX) 40 MG tablet Take 1 tablet by mouth every morning (before breakfast) 30 tablet 0    metoprolol succinate (TOPROL XL) 50 MG extended release tablet Take 2 tablets by mouth in the morning and at bedtime 180 tablet 3    sacubitril-valsartan (ENTRESTO) 24-26 MG per tablet Take 1 tablet by mouth 2 times daily 60 tablet 3    loratadine (CLARITIN) 10 MG tablet Take 10 mg by mouth as needed      hydroCHLOROthiazide (MICROZIDE) 12.5 MG capsule Take 12.5 mg by mouth daily      apixaban (ELIQUIS) 5 MG TABS tablet Take by mouth 2 times daily      TURMERIC PO Take by mouth      calcium carbonate (OSCAL) 500 MG TABS tablet Take 500 mg by mouth daily      Multiple Vitamins-Minerals (THERAPEUTIC MULTIVITAMIN-MINERALS) tablet Take 1 tablet by mouth daily      Cholecalciferol (VITAMIN D3) 50 MCG (2000 UT) CAPS Take by mouth       No current facility-administered medications for this visit. No Known Allergies  Health Maintenance   Topic Date Due    Pneumococcal 0-64 years Vaccine (1 - PCV) Never done    Depression Screen  Never done    HIV screen  Never done    Hepatitis C screen  Never done    DTaP/Tdap/Td vaccine (1 - Tdap) Never done    Cervical cancer screen  Never done    Colorectal Cancer Screen  Never done    Breast cancer screen  Never done    Shingles vaccine (1 of 2) Never done    COVID-19 Vaccine (3 - Booster for Pfizer series) 09/06/2021    Flu vaccine (Season Ended) 09/01/2022    Lipids  04/08/2027    Hepatitis A vaccine  Aged Out    Hepatitis B vaccine  Aged Out    Hib vaccine  Aged Out    Meningococcal (ACWY) vaccine  Aged Out       Subjective:  Review of Systems  General:   No fever, no chills, No fatigue or weight loss  Pulmonary:    No dyspnea, no wheezing  Cardiac:    Denies recent chest pain,   GI:     No nausea or vomiting, no abdominal pain  Neuro:    No dizziness or light headedness,   Musculoskeletal:  No recent active issues  Extremities:   No edema, no obvious claudication       Objective:  Physical Exam  /64   Pulse 52   Ht 5' 6.5\" (1.689 m)   Wt 143 lb (64.9 kg)   BMI 22.74 kg/m²   General:   Well developed, well nourished  Lungs:   Clear to auscultation  Heart:    Normal S1 S2, Slight murmur. no rubs, no gallops  Abdomen:   Soft, non tender, no organomegalies, positive bowel sounds  Extremities:   No edema, no cyanosis, good peripheral pulses  Neurological:   Awake, alert, oriented. No obvious focal deficits  Musculoskelatal:  No obvious deformities    Assessment:      Diagnosis Orders   1.  Dilated cardiomyopathy (HCC)     2. Paroxysmal atrial fibrillation [facility])     as above  Recent ablation     Plan:  No follow-ups on file. As above  Await heron decision for amio and eliquis   Continue risk factor modification and medical management  Thank you for allowing me to participate in the care of your patient. Please don't hesitate to contact me regarding any further issues related to the patient care    Orders Placed:  No orders of the defined types were placed in this encounter. Medications Prescribed:  No orders of the defined types were placed in this encounter. Discussed use, benefit, and side effects of prescribed medications. All patient questions answered. Pt voicedunderstanding. Instructed to continue current medications, diet and exercise. Continue risk factor modification and medical management. Patient agreed with treatment plan. Follow up as directed.     Electronically signedby Chandu Toledo MD on 5/19/2022 at 3:05 PM

## 2022-05-25 ENCOUNTER — OFFICE VISIT (OUTPATIENT)
Dept: CARDIOLOGY CLINIC | Age: 62
End: 2022-05-25

## 2022-05-25 VITALS
WEIGHT: 141.8 LBS | HEIGHT: 66 IN | DIASTOLIC BLOOD PRESSURE: 70 MMHG | HEART RATE: 53 BPM | BODY MASS INDEX: 22.79 KG/M2 | SYSTOLIC BLOOD PRESSURE: 138 MMHG

## 2022-05-25 DIAGNOSIS — Z98.890 STATUS POST ABLATION OF ATRIAL FIBRILLATION: Primary | ICD-10-CM

## 2022-05-25 DIAGNOSIS — Z86.79 STATUS POST ABLATION OF ATRIAL FIBRILLATION: Primary | ICD-10-CM

## 2022-05-25 PROCEDURE — 99214 OFFICE O/P EST MOD 30 MIN: CPT | Performed by: INTERNAL MEDICINE

## 2022-05-25 PROCEDURE — 93000 ELECTROCARDIOGRAM COMPLETE: CPT | Performed by: INTERNAL MEDICINE

## 2022-05-25 NOTE — PROGRESS NOTES
[address] [facility]  Dept: [phone]  CARDIAC ELECTROPHYSIOLOGY: FOLLOW UP NOTE  PATIENT DEMOGRAPHICS:  Date:   5/25/2022  Patient name:              Ksenia Craft  YOB: 1960  Sex:    female   MRN:   [id number]    PRIMARY CARE PHYSICIAN:   GIANCARLO Arambula - CNP    CARDIOLOGIST:  Anastasiia Yeung MD    REASON FOR CONSULTATION:  Follow up atrial fibrillation ablation. HISTORY OF PRESENT ILLNESS:  62/F with drug refractory symptomatic persistent atrial fibrillation and nonischemic cardiomyopathy, presumed to be secondary to atrial fibrillation underwent A. fib ablation on 4/21/2022. She is here for follow-up visit. The patient is doing well. No more palpitations. Shortness of breath is improved. More energetic. No groin pain bleeding or swelling. No syncope, shortness of breath or lower extremity swelling. Take medication regularly. Medical Hx: PeAF dx 11/2021 failed amiodarone therapy s/p PVI and PWI (4/21/2022), NICM dx 11/2021 (LVEF 30%), HTN and chronic smoking. REVIEW OF SYSTEMS:    Constitutional: Negative for chills and fever  HENT: Negative for congestion, sinus pressure, sneezing and sore throat. Eyes: Negative for pain, discharge, redness and itching. Respiratory: Negative for apnea, cough  Gastrointestinal: Negative for blood in stool, constipation, diarrhea   Endocrine: Negative for cold intolerance, heat intolerance, polydipsia. Genitourinary: Negative for dysuria, enuresis, flank pain and hematuria. Musculoskeletal: Negative for arthralgias, joint swelling and neck pain. Neurological: Negative for numbness and headaches. Psychiatric/Behavioral: Negative for agitation, confusion, decreased concentration and dysphoric mood.       PAST MEDICAL HISTORY:  Past Medical History:   Diagnosis Date    Atrial fibrillation [facility])     History of cardiac cath 04/2022    Hypertension PSH:  Past Surgical History:   Procedure Laterality Date    TUBAL LIGATION         FAMILY HISTORY:  Family History   Problem Relation Age of Onset    Heart Disease Father         SOCIAL HISTORY:   and lives with her . 3 children and 2 grandchildren. Smokes a pack of cigarettes for many years. 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   Tobacco Use    Smoking status: Current Every Day Smoker     Packs/day: 0.25    Smokeless tobacco: Never Used    Tobacco comment: 4 -5 cigs per day   Vaping Use    Vaping Use: Never used   Substance and Sexual Activity    Alcohol use: Yes     Alcohol/week: 12.0 standard drinks     Types: 12 Glasses of wine per week    Drug use: Never    Sexual activity: Not on file   Other Topics Concern    Not on file   Social History Narrative    Not on file     Social Determinants of Health     Financial Resource Strain:     Difficulty of Paying Living Expenses: Not on file   Food Insecurity:     Worried About Running Out of Food in the Last Year: Not on file    Gail of Food in the Last Year: Not on file   Transportation Needs:     Lack of Transportation (Medical): Not on file    Lack of Transportation (Non-Medical):  Not on file   Physical Activity:     Days of Exercise per Week: Not on file    Minutes of Exercise per Session: Not on file   Stress:     Feeling of Stress : Not on file   Social Connections:     Frequency of Communication with Friends and Family: Not on file    Frequency of Social Gatherings with Friends and Family: Not on file    Attends Mu-ism Services: Not on file    Active Member of Clubs or Organizations: Not on file    Attends Club or Organization Meetings: Not on file    Marital Status: Not on file   Intimate Partner Violence:     Fear of Current or Ex-Partner: Not on file    Emotionally Abused: Not on file    Physically Abused: Not on file    Sexually Abused: Not on file   Housing Stability:     Unable to Pay for Housing in the Last Year: Not on file    Number of [place] in the Last Year: Not on file    Unstable Housing in the Last Year: Not on file        ALLERGY HISTORY:  No Known Allergies     MEDICATIONS:  Current Outpatient Medications   Medication Sig Dispense Refill    pantoprazole (PROTONIX) 40 MG tablet Take 1 tablet by mouth every morning (before breakfast) 90 tablet 3    amiodarone (CORDARONE) 200 MG tablet 400 mg twice daily for a week, then 200 mg bid for a week and then 200 g daily. 120 tablet 1    metoprolol succinate (TOPROL XL) 50 MG extended release tablet Take 2 tablets by mouth in the morning and at bedtime 180 tablet 3    sacubitril-valsartan (ENTRESTO) 24-26 MG per tablet Take 1 tablet by mouth 2 times daily 60 tablet 3    loratadine (CLARITIN) 10 MG tablet Take 10 mg by mouth as needed      hydroCHLOROthiazide (MICROZIDE) 12.5 MG capsule Take 12.5 mg by mouth daily      apixaban (ELIQUIS) 5 MG TABS tablet Take by mouth 2 times daily      TURMERIC PO Take by mouth      calcium carbonate (OSCAL) 500 MG TABS tablet Take 500 mg by mouth daily      Multiple Vitamins-Minerals (THERAPEUTIC MULTIVITAMIN-MINERALS) tablet Take 1 tablet by mouth daily      Cholecalciferol (VITAMIN D3) 50 MCG (2000 UT) CAPS Take by mouth       No current facility-administered medications for this visit. PHYSICAL EXAM:  Vitals:    05/25/22 1338   BP: 138/70   Pulse: 53     Body mass index is 22.89 kg/m². GENERAL: Alert and oriented. No distress. EYES: No pallor or icterus. ENT: No cyanosis. No thyromegaly or cervical LAP. VESSELS: No jugular venous distension or carotid bruits. HEART: Irregular S1/S2. No murmur, rub or gallop.   LUNGS: Clear to auscultation. ABDOMEN: Soft and non-tender. EXTREMITIES: No lower extremity edema. Feet are warm. Groins are soft and nontender. No bleeding or swelling. Normal bruits. NEUROLOGICAL: Grossly normal.     LABORATORY DATA AND DIAGNOSTIC DATA:  I have personally reviewed and interpreted the results of the following diagnostic testing    Lab Results   Component Value Date    WBC 9.4 04/21/2022    HGB 17.7 (H) 04/21/2022    HCT 54.5 (H) 04/21/2022     04/21/2022    CHOL 184 04/08/2022    TRIG 123 04/08/2022    HDL 56 04/08/2022     04/21/2022    K 4.3 04/21/2022    CL 99 04/21/2022    CREATININE 0.9 04/21/2022    BUN 24 (H) 04/21/2022    CO2 25 04/21/2022    INR 1.02 04/21/2022      Echocardiogram 4/8/2022: LVEF 30%, LVWT 0.9 cm and ANA 51. Echo 2/22/2022 ([facility]): LVEF 30% and moderate MR. Small pericardial effusion.  ECG 2/17/2022, 2/22/2022, 3/16/2022 and 3/23/2022: AF RVR. Normal QRS. ECG 5/25/2022: Sinus bradycardia. Normal QRS duration.  LHC 4/8/2022: Mild CAD. IMPRESSION:  · Amiodarone refractory PeAF s/p AF ablation. [id number] score= 3 (gender, HTN and CHF). On amiodarone and apixaban. · Sinus bradycardia, asymptomatic. Most probably related to medications. · NICM, LVEF 30% and NYHA III. · Mild CAD.   · HTN, controlled. · Chronic smoking, patient counseled. ASSESSMENT AND RECOMMENDATIONS:  The patient is doing well. In sinus rhythm. No recurrences. Marked symptomatic improvement. We will request an echocardiogram in 6 to 8 weeks and decide regarding AICD implantation accordingly. Hopefully we should be able to get her off antiarrhythmic therapy as well. Patient is agreement with the plan. Thank you for allowing me to participate in the care of your patient. Please call me if you have any questions. **This report has been created using voice recognition software. It may contain minor errors which are inherent in voice recognition technology. ** Karire Gilbert MD, MRCP, Calista Boards on 5/25/2022 at 8:13 AM

## 2022-07-11 ENCOUNTER — HOSPITAL ENCOUNTER (OUTPATIENT)
Dept: NON INVASIVE DIAGNOSTICS | Age: 62
Discharge: HOME | End: 2022-07-11

## 2022-07-11 DIAGNOSIS — Z98.890 STATUS POST ABLATION OF ATRIAL FIBRILLATION: ICD-10-CM

## 2022-07-11 DIAGNOSIS — Z86.79 STATUS POST ABLATION OF ATRIAL FIBRILLATION: ICD-10-CM

## 2022-07-11 PROCEDURE — 93307 TTE W/O DOPPLER COMPLETE: CPT

## 2022-08-01 ENCOUNTER — TELEPHONE (OUTPATIENT)
Dept: CARDIOLOGY CLINIC | Age: 62
End: 2022-08-01

## 2022-08-02 NOTE — TELEPHONE ENCOUNTER
notified EF 62, no need for ICD, f/u with [facility].   Asked him to call office if any questions

## 2022-09-26 RX ORDER — METOPROLOL SUCCINATE 100 MG/1
100 TABLET, EXTENDED RELEASE ORAL 2 TIMES DAILY
Qty: 60 TABLET | Refills: 5 | Status: SHIPPED | OUTPATIENT
Start: 2022-09-26

## 2022-09-26 NOTE — TELEPHONE ENCOUNTER
Pt okay with changing metoprolol XL to 100mg tablets instead of 50mg tablets. Pt aware to only take 1 tablet BID, verses the 2 tablets she is currently taking.

## 2022-11-07 RX ORDER — AMIODARONE HYDROCHLORIDE 200 MG/1
TABLET ORAL
Qty: 90 TABLET | Refills: 0 | Status: SHIPPED | OUTPATIENT
Start: 2022-11-07

## 2022-11-07 NOTE — TELEPHONE ENCOUNTER
Candice Bianchi called requesting a refill on the following medications:  Requested Prescriptions     Pending Prescriptions Disp Refills    amiodarone (CORDARONE) 200 MG tablet 120 tablet 1     Si mg twice daily for a week, then 200 mg bid for a week and then 200 g daily. Pharmacy verified: [facility]  . pv      Date of last visit: 2022  Date of next visit (if applicable):

## 2022-12-01 ENCOUNTER — TELEPHONE (OUTPATIENT)
Dept: CARDIOLOGY CLINIC | Age: 62
End: 2022-12-01

## 2022-12-01 NOTE — TELEPHONE ENCOUNTER
[employer] called to RS her appt for today 12-01 with Dr Mark Anthony Chambers, she is having vehicle issues. I canceled the appt, please call her to get a new appt scheduled with anai.

## 2023-02-01 ENCOUNTER — OFFICE VISIT (OUTPATIENT)
Dept: CARDIOLOGY CLINIC | Age: 63
End: 2023-02-01

## 2023-02-01 ENCOUNTER — TELEPHONE (OUTPATIENT)
Dept: CARDIOLOGY CLINIC | Age: 63
End: 2023-02-01

## 2023-02-01 VITALS
WEIGHT: 155 LBS | BODY MASS INDEX: 24.91 KG/M2 | HEART RATE: 60 BPM | HEIGHT: 66 IN | DIASTOLIC BLOOD PRESSURE: 72 MMHG | SYSTOLIC BLOOD PRESSURE: 128 MMHG

## 2023-02-01 DIAGNOSIS — I10 PRIMARY HYPERTENSION: ICD-10-CM

## 2023-02-01 DIAGNOSIS — I42.0 DILATED CARDIOMYOPATHY (HCC): Primary | ICD-10-CM

## 2023-02-01 PROCEDURE — 99213 OFFICE O/P EST LOW 20 MIN: CPT | Performed by: NUCLEAR MEDICINE

## 2023-02-01 PROCEDURE — 3078F DIAST BP <80 MM HG: CPT | Performed by: NUCLEAR MEDICINE

## 2023-02-01 PROCEDURE — 3074F SYST BP LT 130 MM HG: CPT | Performed by: NUCLEAR MEDICINE

## 2023-02-01 NOTE — PROGRESS NOTES
[address] [address] [address] [address]  [address] [place] [address]  Dept: [phone]  Dept Fax: [phone]  Loc: [phone]    Visit Date: 2/1/2023    Elmira Solis is a 65 y.o. female who presents todayfor:  Chief Complaint   Patient presents with    Follow-up    Atrial Fibrillation    Cardiomyopathy     Improved CMP   A fib ablation   Has been in rhythm   No chest pain   Last EF 60%  Bp is stable   Cath was okay     HPI:  HPI  Past Medical History:   Diagnosis Date    Atrial fibrillation ([address]     History of cardiac cath 04/2022    Hypertension       Past Surgical History:   Procedure Laterality Date    TUBAL LIGATION       Family History   Problem Relation Age of Onset    Heart Disease Father      Social History     Tobacco Use    Smoking status: Every Day     Packs/day: 0.25     Types: Cigarettes    Smokeless tobacco: Never    Tobacco comments:     4 -5 cigs per day   Substance Use Topics    Alcohol use:  Yes     Alcohol/week: 12.0 standard drinks     Types: 12 Glasses of wine per week      Current Outpatient Medications   Medication Sig Dispense Refill    sacubitril-valsartan (ENTRESTO) 24-26 MG per tablet Take 1 tablet by mouth 2 times daily 60 tablet 0    amiodarone (CORDARONE) 200 MG tablet One tablet daily 90 tablet 0    metoprolol succinate (TOPROL XL) 100 MG extended release tablet Take 1 tablet by mouth in the morning and at bedtime 60 tablet 5    metoprolol succinate (TOPROL XL) 50 MG extended release tablet Take 2 tablets by mouth in the morning and at bedtime 180 tablet 3    loratadine (CLARITIN) 10 MG tablet Take 10 mg by mouth as needed      hydroCHLOROthiazide (MICROZIDE) 12.5 MG capsule Take 12.5 mg by mouth daily      apixaban (ELIQUIS) 5 MG TABS tablet Take by mouth 2 times daily      TURMERIC PO Take by mouth      calcium carbonate (OSCAL) 500 MG TABS tablet Take 500 mg by mouth daily      Multiple Vitamins-Minerals (THERAPEUTIC MULTIVITAMIN-MINERALS) tablet Take 1 tablet by mouth daily      Cholecalciferol (VITAMIN D3) 50 MCG (2000 UT) CAPS Take by mouth      pantoprazole (PROTONIX) 40 MG tablet Take 1 tablet by mouth every morning (before breakfast) (Patient not taking: No sig reported) 90 tablet 3     No current facility-administered medications for this visit. No Known Allergies  Health Maintenance   Topic Date Due    Pneumococcal 0-64 years Vaccine (1 - PCV) Never done    Depression Screen  Never done    HIV screen  Never done    Hepatitis C screen  Never done    DTaP/Tdap/Td vaccine (1 - Tdap) Never done    Cervical cancer screen  Never done    Colorectal Cancer Screen  Never done    Breast cancer screen  Never done    Shingles vaccine (1 of 2) Never done    COVID-19 Vaccine (3 - Booster for Pfizer series) 06/01/2021    Flu vaccine (1) Never done    Lipids  04/08/2027    Hepatitis A vaccine  Aged Out    Hib vaccine  Aged Out    Meningococcal (ACWY) vaccine  Aged Out       Subjective:  Review of Systems  General:   No fever, no chills, No fatigue or weight loss  Pulmonary:    No dyspnea, no wheezing  Cardiac:    Denies recent chest pain,   GI:     No nausea or vomiting, no abdominal pain  Neuro:    No dizziness or light headedness,   Musculoskeletal:  No recent active issues  Extremities:   No edema, no obvious claudication     Objective:  Physical Exam  /72   Pulse 60   Ht 5' 6\" (1.676 m)   Wt 155 lb (70.3 kg)   BMI 25.02 kg/m²   General:   Well developed, well nourished  Lungs:   Clear to auscultation  Heart:    Normal S1 S2, Slight murmur. no rubs, no gallops  Abdomen:   Soft, non tender, no organomegalies, positive bowel sounds  Extremities:   No edema, no cyanosis, good peripheral pulses  Neurological:   Awake, alert, oriented. No obvious focal deficits  Musculoskelatal:  No obvious deformities    Assessment:      Diagnosis Orders   1. Dilated cardiomyopathy ([facility] [address]        2.  Primary hypertension        As above  Cardiac fair for now   A fib is stable    Plan:  No follow-ups on file. As above  We will check with Angelia about amiodarone   Continue risk factor modification and medical management  Thank you for allowing me to participate in the care of your patient. Please don't hesitate to contact me regarding any further issues related to the patient care    Orders Placed:  No orders of the defined types were placed in this encounter. Medications Prescribed:  No orders of the defined types were placed in this encounter. Discussed use, benefit, and side effects of prescribed medications. All patient questions answered. Pt voicedunderstanding. Instructed to continue current medications, diet and exercise. Continue risk factor modification and medical management. Patient agreed with treatment plan. Follow up as directed.     Electronically signedby Melina Parra MD on 2/1/2023 at 12:20 PM

## 2023-02-01 NOTE — TELEPHONE ENCOUNTER
Pt seen in clinic today by Dr. Yadira Watson without concerns and is doing well. Dr. Yadira Watson would like the OK from Dr. Jack Lora to stop Amiodarone; please advise. Thank you!

## 2023-02-16 RX ORDER — SACUBITRIL AND VALSARTAN 24; 26 MG/1; MG/1
1 TABLET, FILM COATED ORAL 2 TIMES DAILY
Qty: 56 TABLET | Refills: 0 | COMMUNITY
Start: 2023-02-16

## 2023-02-16 RX ORDER — SACUBITRIL AND VALSARTAN 24; 26 MG/1; MG/1
1 TABLET, FILM COATED ORAL 2 TIMES DAILY
COMMUNITY
End: 2023-02-16 | Stop reason: SDUPTHER

## 2023-02-16 NOTE — TELEPHONE ENCOUNTER
LM for patient to call office back. Need patient to fill out [employer] forms for the year 2023. Needs proof of income and copy of insurance cards. Dr Myriam Ahn pages are done. Forms in Dr. Myriam Ahn box.

## 2023-02-17 NOTE — TELEPHONE ENCOUNTER
Spoke to patient, informed her once her portion is complete she needs to send it to the office so it can be sent in with Dr Portia graham. Pt expressed understanding.

## 2023-03-28 RX ORDER — METOPROLOL SUCCINATE 100 MG/1
100 TABLET, EXTENDED RELEASE ORAL 2 TIMES DAILY
Qty: 180 TABLET | Refills: 3 | Status: SHIPPED | OUTPATIENT
Start: 2023-03-28

## 2023-05-09 RX ORDER — PANTOPRAZOLE SODIUM 40 MG/1
40 TABLET, DELAYED RELEASE ORAL
Qty: 90 TABLET | Refills: 3 | Status: SHIPPED | OUTPATIENT
Start: 2023-05-09

## 2023-05-09 NOTE — TELEPHONE ENCOUNTER
Ebonie Gonzalez called requesting a refill on the following medications:  Requested Prescriptions     Pending Prescriptions Disp Refills    pantoprazole (PROTONIX) 40 MG tablet 90 tablet 3     Sig: Take 1 tablet by mouth every morning (before breakfast)     Pharmacy verified:  .pv  [address], [address]  [phone] [address] [place] [phone]    Date of last visit: 02/01/2023  Date of next visit (if applicable): 59/35/3198

## 2023-07-11 ENCOUNTER — TELEPHONE (OUTPATIENT)
Dept: CARDIOLOGY CLINIC | Age: 63
End: 2023-07-11

## 2023-07-11 NOTE — TELEPHONE ENCOUNTER
Pt calling stating she saw a nutritionist and she starting some herbal medication.   She is asking if this is okay with Dr Carmen Dominguez:   Samantha Saravia 2 daily-for Blood Vessel Support  Cyruta plus -3 daily  A-C Carbamide- cellular fluid level  Catapplex E-2- Antioxidant and cell support    These have all been added to med list

## 2024-01-03 NOTE — TELEPHONE ENCOUNTER
Received refill request for entresto 24-26 mg but request came from a different pharmacy that was not in chart.   LMOV to call back and confirm pharmacy  Pharmacy: [facility] [address] [place] [address] [address] [address]  Ph: [phone] Fax: [phone]

## 2024-02-07 ENCOUNTER — OFFICE VISIT (OUTPATIENT)
Dept: CARDIOLOGY CLINIC | Age: 64
End: 2024-02-07

## 2024-02-07 VITALS
SYSTOLIC BLOOD PRESSURE: 138 MMHG | WEIGHT: 154 LBS | DIASTOLIC BLOOD PRESSURE: 76 MMHG | HEART RATE: 64 BPM | BODY MASS INDEX: 24.75 KG/M2 | HEIGHT: 66 IN

## 2024-02-07 DIAGNOSIS — I10 PRIMARY HYPERTENSION: ICD-10-CM

## 2024-02-07 DIAGNOSIS — I48.0 PAROXYSMAL ATRIAL FIBRILLATION (HCC): Primary | ICD-10-CM

## 2024-02-07 PROCEDURE — 3075F SYST BP GE 130 - 139MM HG: CPT | Performed by: NUCLEAR MEDICINE

## 2024-02-07 PROCEDURE — 3078F DIAST BP <80 MM HG: CPT | Performed by: NUCLEAR MEDICINE

## 2024-02-07 PROCEDURE — 99213 OFFICE O/P EST LOW 20 MIN: CPT | Performed by: NUCLEAR MEDICINE

## 2024-02-07 RX ORDER — METOPROLOL SUCCINATE 100 MG/1
100 TABLET, EXTENDED RELEASE ORAL 2 TIMES DAILY
Qty: 180 TABLET | Refills: 3 | Status: SHIPPED | OUTPATIENT
Start: 2024-02-07

## 2024-02-07 RX ORDER — PSEUDOEPHEDRINE HCL 30 MG
500 TABLET ORAL DAILY
COMMUNITY

## 2024-02-07 NOTE — PROGRESS NOTES
peripheral pulses  Neurological:   Awake, alert, oriented. No obvious focal deficits  Musculoskelatal:  No obvious deformities   /76   Pulse 64   Ht 1.676 m (5' 6\")   Wt 69.9 kg (154 lb)   BMI 24.86 kg/m²     Assessment:      Diagnosis Orders   1. Paroxysmal atrial fibrillation (HCC)        2. Primary hypertension        As above  Cardiac seems stable       Plan:  No follow-ups on file.  As above  Continue risk factor modification and medical management  Thank you for allowing me to participate in the care of your patient. Please don't hesitate to contact me regarding any further issues related to the patient care    Orders Placed:  No orders of the defined types were placed in this encounter.      Prescribed:  No orders of the defined types were placed in this encounter.         Discussed use, benefit, and side effects of prescribed medications. All patient questions answered. Pt voicedunderstanding. Instructed to continue current medications, diet and exercise. Continue risk factor modification and medical management. Patient agreed with treatment plan. Follow up as directed.    Electronically signedby Nimo Shoemaker MD on 2/7/2024 at 10:14 AM

## 2024-03-07 ENCOUNTER — TELEPHONE (OUTPATIENT)
Dept: CARDIOLOGY CLINIC | Age: 64
End: 2024-03-07

## 2024-03-07 NOTE — TELEPHONE ENCOUNTER
Patient called asking for our office to refax prescriber form to [facility].  Patient will resend in her forms separate. Forms were sent in too soon per patient.

## 2024-03-08 NOTE — TELEPHONE ENCOUNTER
Angela from [facility] LM on nurse line stating they are unable to get a hold of pt.  [facility] is needing 2nd household members income to complete application.    LM for pt to return call.    [facility] Phone - [phone]  [facility] Fax- [phone]

## 2024-04-01 RX ORDER — METOPROLOL SUCCINATE 100 MG/1
100 TABLET, EXTENDED RELEASE ORAL 2 TIMES DAILY
Qty: 180 TABLET | Refills: 3 | Status: SHIPPED | OUTPATIENT
Start: 2024-04-01

## 2024-04-01 NOTE — TELEPHONE ENCOUNTER
Tiffanie is requesting a refill of their   Requested Prescriptions     Pending Prescriptions Disp Refills    metoprolol succinate (TOPROL XL) 100 MG extended release tablet 180 tablet 3     Sig: Take 1 tablet by mouth in the morning and at bedtime   . Please advise.      Last Appt:  Visit date not found  Next Appt:   Visit date not found  Preferred pharmacy: [facility] [address] [phone] -  [phone]

## 2024-04-30 RX ORDER — PANTOPRAZOLE SODIUM 40 MG/1
40 TABLET, DELAYED RELEASE ORAL
Qty: 90 TABLET | Refills: 2 | Status: SHIPPED | OUTPATIENT
Start: 2024-04-30

## 2024-04-30 NOTE — TELEPHONE ENCOUNTER
Tiffanie Polanco called requesting a refill on the following medications:  Requested Prescriptions     Pending Prescriptions Disp Refills    pantoprazole (PROTONIX) 40 MG tablet 90 tablet 3     Sig: Take 1 tablet by mouth every morning (before breakfast)     Pharmacy verified:[facility]  [place]  .pv      Date of last visit: 2/7/24  Date of next visit (if applicable): 2/10/25

## 2025-03-03 ENCOUNTER — OFFICE VISIT (OUTPATIENT)
Dept: CARDIOLOGY CLINIC | Age: 65
End: 2025-03-03

## 2025-03-03 VITALS
BODY MASS INDEX: 25.16 KG/M2 | HEIGHT: 64 IN | DIASTOLIC BLOOD PRESSURE: 68 MMHG | SYSTOLIC BLOOD PRESSURE: 110 MMHG | HEART RATE: 70 BPM | WEIGHT: 147.4 LBS

## 2025-03-03 DIAGNOSIS — I48.0 PAROXYSMAL ATRIAL FIBRILLATION (HCC): Primary | ICD-10-CM

## 2025-03-03 PROCEDURE — 99214 OFFICE O/P EST MOD 30 MIN: CPT | Performed by: PHYSICIAN ASSISTANT

## 2025-03-03 PROCEDURE — 3074F SYST BP LT 130 MM HG: CPT | Performed by: PHYSICIAN ASSISTANT

## 2025-03-03 PROCEDURE — 3078F DIAST BP <80 MM HG: CPT | Performed by: PHYSICIAN ASSISTANT

## 2025-03-03 PROCEDURE — 93000 ELECTROCARDIOGRAM COMPLETE: CPT | Performed by: PHYSICIAN ASSISTANT

## 2025-03-03 RX ORDER — ATORVASTATIN CALCIUM 10 MG/1
10 TABLET, FILM COATED ORAL DAILY
COMMUNITY

## 2025-03-03 NOTE — PROGRESS NOTES
which includes Eliquis 5 mg twice a day, atorvastatin 10 mg once a day, hydrochlorothiazide 12.5 mg daily, Toprol- mg twice a day, and Entresto 24/26 mg two times a day. No changes or adjustments to her medications are necessary at this time.    2.  HFimpEF    Continue CHF GDMT with Entresto, and metoprolol.    Follow-up  The patient will follow up in 1 year, or earlier if any concerns arise.         The patient (or guardian, if applicable) and other individuals in attendance with the patient were advised that Artificial Intelligence will be utilized during this visit to record, process the conversation to generate a clinical note, and support improvement of the AI technology. The patient (or guardian, if applicable) and other individuals in attendance at the appointment consented to the use of AI, including the recording.                   Deepak Moreno MPAS, PAStephanieC

## 2025-03-21 RX ORDER — METOPROLOL SUCCINATE 50 MG/1
100 TABLET, EXTENDED RELEASE ORAL 2 TIMES DAILY
Qty: 120 TABLET | Refills: 11 | Status: SHIPPED | OUTPATIENT
Start: 2025-03-21

## 2025-03-21 NOTE — TELEPHONE ENCOUNTER
Script was changed at last visit to 1 tablet BID Metoprolol succ 100 because she was taking 50 mg 2 tabs BID.     Pt is self pay and prefers to take 2 tablet of 50 mg BID due to cost.